# Patient Record
Sex: MALE | Race: WHITE | NOT HISPANIC OR LATINO | Employment: PART TIME | URBAN - METROPOLITAN AREA
[De-identification: names, ages, dates, MRNs, and addresses within clinical notes are randomized per-mention and may not be internally consistent; named-entity substitution may affect disease eponyms.]

---

## 2017-03-21 ENCOUNTER — OFFICE VISIT (OUTPATIENT)
Dept: PEDIATRICS | Facility: CLINIC | Age: 37
End: 2017-03-21
Payer: COMMERCIAL

## 2017-03-21 VITALS
SYSTOLIC BLOOD PRESSURE: 110 MMHG | DIASTOLIC BLOOD PRESSURE: 80 MMHG | WEIGHT: 191 LBS | HEIGHT: 71 IN | BODY MASS INDEX: 26.74 KG/M2 | TEMPERATURE: 97 F | OXYGEN SATURATION: 97 % | HEART RATE: 73 BPM

## 2017-03-21 DIAGNOSIS — L91.8 SKIN TAG: ICD-10-CM

## 2017-03-21 DIAGNOSIS — Z00.00 ROUTINE GENERAL MEDICAL EXAMINATION AT A HEALTH CARE FACILITY: Primary | ICD-10-CM

## 2017-03-21 DIAGNOSIS — E66.3 OVERWEIGHT (BMI 25.0-29.9): ICD-10-CM

## 2017-03-21 PROCEDURE — 99395 PREV VISIT EST AGE 18-39: CPT | Performed by: FAMILY MEDICINE

## 2017-03-21 ASSESSMENT — PAIN SCALES - GENERAL: PAINLEVEL: NO PAIN (0)

## 2017-03-21 NOTE — PROGRESS NOTES
SUBJECTIVE:     CC: Dakota France is an 37 year old male who presents for preventative health visit.     Healthy Habits:    Do you get at least three servings of calcium containing foods daily (dairy, green leafy vegetables, etc.)? yes    Amount of exercise or daily activities, outside of work: none    Problems taking medications regularly not applicable    Medication side effects: No    Have you had an eye exam in the past two years? yes    Do you see a dentist twice per year? No-scheduled next week    Do you have sleep apnea, excessive snoring or daytime drowsiness?no            Today's PHQ-2 Score:   PHQ-2 ( 1999 [employer]) 3/21/2017 6/16/2016   Q1: Little interest or pleasure in doing things 0 0   Q2: Feeling down, depressed or hopeless 0 0   PHQ-2 Score 0 0       Abuse: Current or Past(Physical, Sexual or Emotional)- No  Do you feel safe in your environment - Yes    Social History   Substance Use Topics     Smoking status: Former Smoker     Smokeless tobacco: Never Used     Alcohol use Yes     The patient does not drink >3 drinks per day nor >7 drinks per week.    Last PSA: No results found for: PSA    Recent Labs   Lab Test  05/11/15   0741   CHOL  209*   HDL  43   LDL  137*   TRIG  145   CHOLHDLRATIO  4.9       Reviewed orders with patient. Reviewed health maintenance and updated orders accordingly - Yes    Reviewed and updated as needed this visit by clinical staff  Tobacco  Allergies  Meds  Med Hx  Surg Hx  Fam Hx  Soc Hx        Reviewed and updated as needed this visit by Provider          Past Medical History   Diagnosis Date     PAC (premature atrial contraction)       Past Surgical History   Procedure Laterality Date     Herniorrhaphy inguinal bilateral              ROS:  C: NEGATIVE for fever, chills, change in weight  I: NEGATIVE for worrisome rashes, moles or lesions  E: NEGATIVE for vision changes or irritation  ENT: NEGATIVE for ear, mouth and throat problems  R: NEGATIVE for significant  "cough or SOB  CV: NEGATIVE for chest pain, palpitations or peripheral edema  GI: NEGATIVE for nausea, abdominal pain, heartburn, or change in bowel habits   male: negative for dysuria, hematuria, decreased urinary stream, erectile dysfunction, urethral discharge  M: NEGATIVE for significant arthralgias or myalgia  N: NEGATIVE for weakness, dizziness or paresthesias  E: NEGATIVE for temperature intolerance, skin/hair changes  H: NEGATIVE for bleeding problems  P: NEGATIVE for changes in mood or affect    Problem list, Medication list, Allergies, and Medical/Social/Surgical histories reviewed in [facility] and updated as appropriate.  Labs reviewed in EPIC  BP Readings from Last 3 Encounters:   03/21/17 110/80   06/23/16 118/82   06/16/16 (!) 138/92    Wt Readings from Last 3 Encounters:   03/21/17 191 lb (86.6 kg)   06/16/16 190 lb (86.2 kg)   11/02/15 200 lb (90.7 kg)                  Patient Active Problem List   Diagnosis     H/O cardiac arrhythmia-PAC     Overweight (BMI 25.0-29.9)     CARDIOVASCULAR SCREENING; LDL GOAL LESS THAN 160     Skin tag     Past Surgical History   Procedure Laterality Date     Herniorrhaphy inguinal bilateral         Social History   Substance Use Topics     Smoking status: Former Smoker     Smokeless tobacco: Never Used     Alcohol use Yes     Family History   Problem Relation Age of Onset     Prostate Cancer Father          No current outpatient prescriptions on file.     No Known Allergies  Recent Labs   Lab Test  05/11/15   0741  04/27/15   1452   LDL  137*   --    HDL  43   --    TRIG  145   --    CR   --   1.06   GFRESTIMATED   --   78   GFRESTBLACK   --   >90   GFR Calc     POTASSIUM   --   3.9   TSH   --   2.37      OBJECTIVE:     /80  Pulse 73  Temp 97  F (36.1  C) (Oral)  Ht 5' 10.5\" (1.791 m)  Wt 191 lb (86.6 kg)  SpO2 97%  BMI 27.02 kg/m2  EXAM:  GENERAL: healthy, alert and no distress  EYES: Eyes grossly normal to inspection, PERRL and conjunctivae and " "sclerae normal  HENT: ear canals and TM's normal, nose and mouth without ulcers or lesions  NECK: no adenopathy, no asymmetry, masses, or scars and thyroid normal to palpation  RESP: lungs clear to auscultation - no rales, rhonchi or wheezes  CV: regular rate and rhythm, normal S1 S2, no S3 or S4, no murmur, click or rub, no peripheral edema and peripheral pulses strong  ABDOMEN: soft, nontender, no hepatosplenomegaly, no masses and bowel sounds normal   (male): normal male genitalia without lesions or urethral discharge, no hernia  MS: no gross musculoskeletal defects noted, no edema  SKIN: 1-2mm skjin tags- neck  NEURO: Normal strength and tone, mentation intact and speech normal  PSYCH: mentation appears normal, affect normal/bright    ASSESSMENT/PLAN:     1. Routine general medical examination at a health care facility  : Discussed on regular exercises, healthy eating, self testicular exams  and routine dental checks.      2. Skin tag  Monitor, rtc prn    3. Overweight (BMI 25.0-29.9)  Emphasized on weight loss, portion control, low calorie and low fat diet, healthy eating, regular exercises.        COUNSELING:  Reviewed preventive health counseling, as reflected in patient instructions  Special attention given to:        Regular exercise       Healthy diet/nutrition         reports that he has quit smoking. He has never used smokeless tobacco.    Estimated body mass index is 27.02 kg/(m^2) as calculated from the following:    Height as of this encounter: 5' 10.5\" (1.791 m).    Weight as of this encounter: 191 lb (86.6 kg).   Weight management plan: Discussed healthy diet and exercise guidelines and patient will follow up in 12 months in clinic to re-evaluate.    Counseling Resources:  ATP IV Guidelines  Pooled Cohorts Equation Calculator  FRAX Risk Assessment  ICSI Preventive Guidelines  Dietary Guidelines for Americans, 2010  USDA's MyPlate  ASA Prophylaxis  Lung CA Screening    Kris Bernardo MD  M " [facility]  Chart documentation done in part with Dragon Voice recognition Software. Although reviewed after completion, some word and grammatical error may remain.

## 2017-03-21 NOTE — MR AVS SNAPSHOT
After Visit Summary   3/21/2017    Dakota France    MRN: [id number]           Patient Information     Date Of Birth          1980        Visit Information        Provider Department      3/21/2017 3:40 PM Kris Bernardo MD [facility]        Today's Diagnoses     Routine general medical examination at a health care facility    -  1    Skin tag          Care Instructions      Preventive Health Recommendations  Male Ages 26 - 39    Yearly exam:             See your health care provider every year in order to  o   Review health changes.   o   Discuss preventive care.    o   Review your medicines if your doctor has prescribed any.    You should be tested each year for STDs (sexually transmitted diseases), if you re at risk.     After age 35, talk to your provider about cholesterol testing. If you are at risk for heart disease, have your cholesterol tested at least every 5 years.     If you are at risk for diabetes, you should have a diabetes test (fasting glucose).  Shots: Get a flu shot each year. Get a tetanus shot every 10 years.     Nutrition:    Eat at least 5 servings of fruits and vegetables daily.     Eat whole-grain bread, whole-wheat pasta and brown rice instead of white grains and rice.     Talk to your provider about Calcium and Vitamin D.     Lifestyle    Exercise for at least 150 minutes a week (30 minutes a day, 5 days a week). This will help you control your weight and prevent disease.     Limit alcohol to one drink per day.     No smoking.     Wear sunscreen to prevent skin cancer.     See your dentist every six months for an exam and cleaning.           Follow-ups after your visit        Who to contact     If you have questions or need follow up information about today's clinic visit or your schedule please contact [facility] directly at [phone].  Normal or non-critical lab and imaging results will be communicated to you by Magalie  "letter or phone within 4 business days after the clinic has received the results. If you do not hear from us within 7 days, please contact the clinic through [employer] or phone. If you have a critical or abnormal lab result, we will notify you by phone as soon as possible.  Submit refill requests through [employer] or call your pharmacy and they will forward the refill request to us. Please allow 3 business days for your refill to be completed.          Additional Information About Your Visit        Zvooq Information     Zvooq is an electronic gateway that provides easy, online access to your medical records. With Zvooq, you can request a clinic appointment, read your test results, renew a prescription or communicate with your care team.     To sign up for Zvooq visit the website at [email or web address]   You will be asked to enter the access code listed below, as well as some personal information. Please follow the directions to create your username and password.     Your access code is: [email or web address]  Expires: 2017  3:57 PM     Your access code will  in 90 days. If you need help or a new code, please contact your [facility] or call [phone] for assistance.        Care EveryWhere ID     This is your Care EveryWhere ID. This could be used by other organizations to access your [facility] medical records  [id number]        Your Vitals Were     Pulse Temperature Height Pulse Oximetry BMI (Body Mass Index)       73 97  F (36.1  C) (Oral) 5' 10.5\" (1.791 m) 97% 27.02 kg/m2        Blood Pressure from Last 3 Encounters:   17 110/80   16 118/82   16 (!) 138/92    Weight from Last 3 Encounters:   17 191 lb (86.6 kg)   16 190 lb (86.2 kg)   11/02/15 200 lb (90.7 kg)              Today, you had the following     No orders found for display       Primary Care Provider Office Phone # Fax #    Kris Bernardo [phone]       " [facility] [address]  [facility] [address]        Thank you!     Thank you for choosing [facility]  for your care. Our goal is always to provide you with excellent care. Hearing back from our patients is one way we can continue to improve our services. Please take a few minutes to complete the written survey that you may receive in the mail after your visit with us. Thank you!             Your Updated Medication List - Protect others around you: Learn how to safely use, store and throw away your medicines at www.disposemymeds.org.      Notice  As of 3/21/2017  3:57 PM    You have not been prescribed any medications.

## 2017-03-21 NOTE — NURSING NOTE
"Chief Complaint   Patient presents with     Physical       Initial /80  Pulse 73  Temp 97  F (36.1  C) (Oral)  Ht 5' 10.5\" (1.791 m)  Wt 191 lb (86.6 kg)  SpO2 97%  BMI 27.02 kg/m2 Estimated body mass index is 27.02 kg/(m^2) as calculated from the following:    Height as of this encounter: 5' 10.5\" (1.791 m).    Weight as of this encounter: 191 lb (86.6 kg).  BP completed using cuff size: carlota Li, ISSA    "

## 2017-12-19 ENCOUNTER — OFFICE VISIT (OUTPATIENT)
Dept: FAMILY MEDICINE | Facility: CLINIC | Age: 37
End: 2017-12-19
Payer: COMMERCIAL

## 2017-12-19 VITALS
BODY MASS INDEX: 29.38 KG/M2 | OXYGEN SATURATION: 98 % | WEIGHT: 207.7 LBS | DIASTOLIC BLOOD PRESSURE: 87 MMHG | TEMPERATURE: 98.1 F | HEART RATE: 81 BPM | SYSTOLIC BLOOD PRESSURE: 133 MMHG

## 2017-12-19 DIAGNOSIS — H91.91 HEARING LOSS OF RIGHT EAR, UNSPECIFIED HEARING LOSS TYPE: ICD-10-CM

## 2017-12-19 DIAGNOSIS — J01.90 ACUTE SINUSITIS WITH SYMPTOMS > 10 DAYS: Primary | ICD-10-CM

## 2017-12-19 DIAGNOSIS — Z23 NEED FOR PROPHYLACTIC VACCINATION AND INOCULATION AGAINST INFLUENZA: ICD-10-CM

## 2017-12-19 PROCEDURE — 99213 OFFICE O/P EST LOW 20 MIN: CPT | Performed by: PHYSICIAN ASSISTANT

## 2017-12-19 NOTE — MR AVS SNAPSHOT
After Visit Summary   12/19/2017    Dakota France    MRN: [id number]           Patient Information     Date Of Birth          1980        Visit Information        Provider Department      12/19/2017 7:20 AM Radha Sandy PA-C [facility]        Today's Diagnoses     Hearing loss of right ear, unspecified hearing loss type    -  1    Need for prophylactic vaccination and inoculation against influenza        Acute sinusitis with symptoms > 10 days          Care Instructions    Augmentin 875 mg, 1 tablet twice a day for 10 days  Sudafed short acting  Increase fluids  Nasal wash  Mucinex DM  If not better after the above treatment, please see ENT            Follow-ups after your visit        Additional Services     OTOLARYNGOLOGY REFERRAL       Your provider has referred you to: [facility]: [facility] [phone]   [email or web address]    Please be aware that coverage of these services is subject to the terms and limitations of your health insurance plan.  Call member services at your health plan with any benefit or coverage questions.      Please bring the following with you to your appointment:    (1) Any X-Rays, CTs or MRIs which have been performed.  Contact the facility where they were done to arrange for  prior to your scheduled appointment.   (2) List of current medications  (3) This referral request   (4) Any documents/labs given to you for this referral                  Your next 10 appointments already scheduled     Apr 11, 2018  4:50 PM CDT   [facility]    [id number] [place] [address] [phone]              Who to contact     If you have questions or need follow up information about today's clinic visit or your schedule please contact [facility] directly at  "[phone].  Normal or non-critical lab and imaging results will be communicated to you by MyChart, letter or phone within 4 business days after the clinic has received the results. If you do not hear from us within 7 days, please contact the clinic through [employer] or phone. If you have a critical or abnormal lab result, we will notify you by phone as soon as possible.  Submit refill requests through [employer] or call your pharmacy and they will forward the refill request to us. Please allow 3 business days for your refill to be completed.          Additional Information About Your Visit        [employer] Information     [employer] lets you send messages to your doctor, view your test results, renew your prescriptions, schedule appointments and more. To sign up, go to [email or web address] . Click on \"Log in\" on the left side of the screen, which will take you to the Welcome page. Then click on \"Sign up Now\" on the right side of the page.     You will be asked to enter the access code listed below, as well as some personal information. Please follow the directions to create your username and password.     Your access code is: [email or web address]  Expires: 3/19/2018  7:57 AM     Your access code will  in 90 days. If you need help or a new code, please call your [facility] clinic or [phone].        Care EveryWhere ID     This is your Care EveryWhere ID. This could be used by other organizations to access your [facility] medical records  [id number]        Your Vitals Were     Pulse Temperature Pulse Oximetry BMI (Body Mass Index)          81 98.1  F (36.7  C) (Oral) 98% 29.38 kg/m2         Blood Pressure from Last 3 Encounters:   17 133/87   17 110/80   16 118/82    Weight from Last 3 Encounters:   17 207 lb 11.2 oz (94.2 kg)   17 191 lb (86.6 kg)   16 190 lb (86.2 kg)              We Performed the Following     OTOLARYNGOLOGY REFERRAL          Today's Medication Changes          These " changes are accurate as of: 12/19/17  7:57 AM.  If you have any questions, ask your nurse or doctor.               Start taking these medicines.        Dose/Directions    amoxicillin-clavulanate 875-125 MG per tablet   Commonly known as:  AUGMENTIN   Used for:  Acute sinusitis with symptoms > 10 days   Started by:  Radha Sandy PA-C        Dose:  1 tablet   Take 1 tablet by mouth 2 times daily for 10 days   Quantity:  20 tablet   Refills:  0            Where to get your medicines      These medications were sent to [facility] [place] - [address]  [address], [place] [address]     Phone:  [phone]     amoxicillin-clavulanate 875-125 MG per tablet                Primary Care Provider Office Phone # Fax #    Kris Bernardo [phone] [address]  [facility] [address]        Equal Access to Services     [facility]: Hadii aad ku hadasho Soomaali, waaxda luqadaha, qaybta kaalmada adeegyada, waxay idiin hayaan anujeg vargasaradagoberto soto . So [facility] [phone].    ATENCIÓN: Si habla español, tiene a mcdonnell disposición servicios gratuitos de asistencia lingüística. [facility] [phone].    We comply with applicable federal civil rights laws and [place] laws. We do not discriminate on the basis of race, color, national origin, age, disability, sex, sexual orientation, or gender identity.            Thank you!     Thank you for choosing [facility]  for your care. Our goal is always to provide you with excellent care. Hearing back from our patients is one way we can continue to improve our services. Please take a few minutes to complete the written survey that you may receive in the mail after your visit with us. Thank you!             Your Updated Medication List - Protect others around you: Learn how to safely use, store and throw away your medicines at www.disposemymeds.org.          This list is  accurate as of: 12/19/17  7:57 AM.  Always use your most recent med list.                   Brand Name Dispense Instructions for use Diagnosis    amoxicillin-clavulanate 875-125 MG per tablet    AUGMENTIN    20 tablet    Take 1 tablet by mouth 2 times daily for 10 days    Acute sinusitis with symptoms > 10 days

## 2017-12-19 NOTE — PROGRESS NOTES
SUBJECTIVE:   Dakota France is a 37 year old male who presents to clinic today for the following health issues:  ENT Symptoms             Symptoms: cc Present Absent Comment   Fever/Chills   x    Fatigue   x Felt tired when symptoms first started   Muscle Aches   x    Eye Irritation   x    Sneezing   x    Nasal Juan F/Drg   x    Sinus Pressure/Pain   x    Loss of smell   x    Dental pain   x    Sore Throat   x    Swollen Glands   x    Ear Pain/Fullness  x  Right ear, woke up and could not hear well   Cough   x    Wheeze   x    Chest Pain   x    Shortness of breath   x    Rash   x    Other   x      Symptom duration:  10/12/17   Symptom severity:  moderate got worse yesterday   Treatments tried:  antihistamines did not work, allegra D- did not help and made sleep    Contacts:  toddler is sick often   Went to [facility]- they told him he has fluid there and to take allegra D but patient could not sleep on it.           Problem list and histories reviewed & adjusted, as indicated.  Additional history: as documented    Patient Active Problem List   Diagnosis     H/O cardiac arrhythmia-PAC     Overweight (BMI 25.0-29.9)     CARDIOVASCULAR SCREENING; LDL GOAL LESS THAN 160     Skin tag     Past Surgical History:   Procedure Laterality Date     HERNIORRHAPHY INGUINAL BILATERAL         Social History   Substance Use Topics     Smoking status: Former Smoker     Smokeless tobacco: Never Used     Alcohol use Yes     Family History   Problem Relation Age of Onset     Prostate Cancer Father          Current Outpatient Prescriptions   Medication Sig Dispense Refill     amoxicillin-clavulanate (AUGMENTIN) 875-125 MG per tablet Take 1 tablet by mouth 2 times daily for 10 days 20 tablet 0     No Known Allergies        ROS:  Constitutional, HEENT, cardiovascular, pulmonary, gi and gu systems are negative, except as otherwise noted.      OBJECTIVE:   /87 (BP Location: Right arm, Patient Position: Chair, Cuff Size:  Adult Regular)  Pulse 81  Temp 98.1  F (36.7  C) (Oral)  Wt 207 lb 11.2 oz (94.2 kg)  SpO2 98%  BMI 29.38 kg/m2  Body mass index is 29.38 kg/(m^2).  GENERAL: healthy, alert and no distress  EYES: Eyes grossly normal to inspection, PERRL and conjunctivae and sclerae normal  HENT: normal cephalic/atraumatic, ear canals and TM's normal, nose and mouth without ulcers or lesions, nasal mucosa edematous  and oral mucous membranes moist  NECK: no adenopathy, no asymmetry, masses, or scars and thyroid normal to palpation  RESP: lungs clear to auscultation - no rales, rhonchi or wheezes  CV: regular rate and rhythm, normal S1 S2, no S3 or S4, no murmur, click or rub, no peripheral edema and peripheral pulses strong  ABDOMEN: soft, nontender, no hepatosplenomegaly, no masses and bowel sounds normal  MS: no gross musculoskeletal defects noted, no edema    Diagnostic Test Results:  none     ASSESSMENT/PLAN:       ICD-10-CM    1. Acute sinusitis with symptoms > 10 days J01.90 amoxicillin-clavulanate (AUGMENTIN) 875-125 MG per tablet   2. Hearing loss of right ear, unspecified hearing loss type H91.91 OTOLARYNGOLOGY REFERRAL   3. Need for prophylactic vaccination and inoculation against influenza Z23    Augmentin 875 mg, 1 tablet twice a day for 10 days  Sudafed short acting  Increase fluids  Nasal wash  Mucinex DM  If not better after the above treatment, please see ENT        Radha Sandy PA-C  [facility]

## 2017-12-19 NOTE — PATIENT INSTRUCTIONS
Augmentin 875 mg, 1 tablet twice a day for 10 days  Sudafed short acting  Increase fluids  Nasal wash  Mucinex DM  If not better after the above treatment, please see ENT

## 2018-04-11 ENCOUNTER — OFFICE VISIT (OUTPATIENT)
Dept: PEDIATRICS | Facility: CLINIC | Age: 38
End: 2018-04-11
Payer: COMMERCIAL

## 2018-04-11 VITALS
TEMPERATURE: 97.6 F | SYSTOLIC BLOOD PRESSURE: 128 MMHG | BODY MASS INDEX: 27.75 KG/M2 | DIASTOLIC BLOOD PRESSURE: 72 MMHG | OXYGEN SATURATION: 97 % | WEIGHT: 198.2 LBS | HEIGHT: 71 IN | HEART RATE: 76 BPM

## 2018-04-11 DIAGNOSIS — E66.3 OVERWEIGHT (BMI 25.0-29.9): ICD-10-CM

## 2018-04-11 DIAGNOSIS — Z80.42 FAMILY HISTORY OF PROSTATE CANCER IN FATHER: ICD-10-CM

## 2018-04-11 DIAGNOSIS — Z00.00 ROUTINE GENERAL MEDICAL EXAMINATION AT A HEALTH CARE FACILITY: Primary | ICD-10-CM

## 2018-04-11 DIAGNOSIS — Z13.6 CARDIOVASCULAR SCREENING; LDL GOAL LESS THAN 160: ICD-10-CM

## 2018-04-11 DIAGNOSIS — Z13.0 SCREENING FOR DEFICIENCY ANEMIA: ICD-10-CM

## 2018-04-11 DIAGNOSIS — Z82.49 FAMILY HISTORY OF ABDOMINAL AORTIC ANEURYSM (AAA): ICD-10-CM

## 2018-04-11 DIAGNOSIS — L81.9 PIGMENTED SKIN LESION OF UNCERTAIN NATURE: ICD-10-CM

## 2018-04-11 DIAGNOSIS — Z13.1 SCREENING FOR DIABETES MELLITUS (DM): ICD-10-CM

## 2018-04-11 PROCEDURE — 99396 PREV VISIT EST AGE 40-64: CPT | Performed by: FAMILY MEDICINE

## 2018-04-11 ASSESSMENT — PAIN SCALES - GENERAL: PAINLEVEL: NO PAIN (0)

## 2018-04-11 NOTE — PROGRESS NOTES
SUBJECTIVE:   CC: Dakota France is an 38 year old male who presents for preventative health visit.     Healthy Habits:    Do you get at least three servings of calcium containing foods daily (dairy, green leafy vegetables, etc.)? yes    Amount of exercise or daily activities, outside of work: no current routine    Problems taking medications regularly not applicable    Medication side effects: No    Have you had an eye exam in the past two years? yes    Do you see a dentist twice per year? No, once per year    Do you have sleep apnea, excessive snoring or daytime drowsiness?no           Today's PHQ-2 Score:   PHQ-2 ( 1999 [employer]) 4/11/2018 3/21/2017   Q1: Little interest or pleasure in doing things 0 0   Q2: Feeling down, depressed or hopeless 0 0   PHQ-2 Score 0 0       Abuse: Current or Past(Physical, Sexual or Emotional)- No  Do you feel safe in your environment - Yes    Social History   Substance Use Topics     Smoking status: Former Smoker     Smokeless tobacco: Never Used     Alcohol use Yes      If you drink alcohol do you typically have >3 drinks per day or >7 drinks per week? No                      Last PSA: No results found for: PSA    Reviewed orders with patient. Reviewed health maintenance and updated orders accordingly - Yes  Labs reviewed in EPIC  BP Readings from Last 3 Encounters:   04/11/18 128/72   12/19/17 133/87   03/21/17 110/80    Wt Readings from Last 3 Encounters:   04/11/18 198 lb 3.2 oz (89.9 kg)   12/19/17 207 lb 11.2 oz (94.2 kg)   03/21/17 191 lb (86.6 kg)                  Patient Active Problem List   Diagnosis     H/O cardiac arrhythmia-PAC     Overweight (BMI 25.0-29.9)     CARDIOVASCULAR SCREENING; LDL GOAL LESS THAN 160     Skin tag     Family history of abdominal aortic aneurysm (AAA)     Family history of prostate cancer in father     Past Surgical History:   Procedure Laterality Date     HERNIORRHAPHY INGUINAL BILATERAL         Social History   Substance Use Topics      "Smoking status: Former Smoker     Smokeless tobacco: Never Used     Alcohol use Yes     Family History   Problem Relation Age of Onset     Prostate Cancer Father      Abdominal Aortic Aneurysm Father 70     Prostate Problems Father          No current outpatient prescriptions on file.     No Known Allergies  Recent Labs   Lab Test  05/11/15   0741  04/27/15   1452   LDL  137*   --    HDL  43   --    TRIG  145   --    CR   --   1.06   GFRESTIMATED   --   78   GFRESTBLACK   --   >90   GFR Calc     POTASSIUM   --   3.9   TSH   --   2.37        Reviewed and updated as needed this visit by clinical staff  Tobacco  Allergies  Meds  Med Hx  Surg Hx  Fam Hx  Soc Hx        Reviewed and updated as needed this visit by Provider          Past Medical History:   Diagnosis Date     PAC (premature atrial contraction)       Past Surgical History:   Procedure Laterality Date     HERNIORRHAPHY INGUINAL BILATERAL              ROS:  C: NEGATIVE for fever, chills, change in weight  INTEGUMENTARY/SKIN: Reoccurring, raised pigmented skin lesion in the pubic area status post excision in the past ×3 with no associated concerns for bleeding, itching, recent increase in size or color change  E: NEGATIVE for vision changes or irritation  ENT: NEGATIVE for ear, mouth and throat problems  R: NEGATIVE for significant cough or SOB  CV: NEGATIVE for chest pain, palpitations or peripheral edema  GI: NEGATIVE for nausea, abdominal pain, heartburn, or change in bowel habits   male: negative for dysuria, hematuria, decreased urinary stream, erectile dysfunction, urethral discharge  M: NEGATIVE for significant arthralgias or myalgia  N: NEGATIVE for weakness, dizziness or paresthesias  E: NEGATIVE for temperature intolerance, skin/hair changes  H: NEGATIVE for bleeding problems  P: NEGATIVE for changes in mood or affect    OBJECTIVE:   /72  Pulse 76  Temp 97.6  F (36.4  C) (Oral)  Ht 5' 10.75\" (1.797 m)  Wt 198 lb 3.2 " oz (89.9 kg)  SpO2 97%  BMI 27.84 kg/m2  EXAM:  GENERAL: healthy, alert and no distress  EYES: Eyes grossly normal to inspection  HENT: ear canals and TM's normal, nose and mouth without ulcers or lesions  NECK: no adenopathy, no asymmetry, masses, or scars and thyroid normal to palpation  RESP: lungs clear to auscultation - no rales, rhonchi or wheezes  CV: regular rate and rhythm, normal S1 S2, no S3 or S4, no murmur, click or rub, no peripheral edema and peripheral pulses strong  ABDOMEN: soft, nontender, no hepatosplenomegaly, no masses and bowel sounds normal  MS: no gross musculoskeletal defects noted, no edema  SKIN: About 3-4 mm, black, raised pigmented skin lesion in the suprapubic area  NEURO: Normal strength and tone, mentation intact and speech normal  PSYCH: mentation appears normal, affect normal/bright    ASSESSMENT/PLAN:   1. Routine general medical examination at a health care facility  : Discussed on regular exercises, healthy eating, self testicular exams  and routine dental checks.  - CBC with platelets differential; Future  - **Comprehensive metabolic panel FUTURE anytime; Future  - Prostate spec antigen screen; Future  - Lipid panel reflex to direct LDL Fasting; Future    2. Family history of abdominal aortic aneurysm (AAA)  Will screen for AAA due to family history, patient is aware to check with insurance before scheduling  - US abdominal aorta limited; Future    3. Family history of prostate cancer in father  Will f/u on results and call with recommendations.    - Prostate spec antigen screen; Future    4. CARDIOVASCULAR SCREENING; LDL GOAL LESS THAN 160    - Lipid panel reflex to direct LDL Fasting; Future    5. Pigmented skin lesion of uncertain nature  3-4mm, suprapubic  Recommended excisional biopsy due to recurrences status post excision ×3-(last excision was 4-5 years ago per patient's report)    6. Screening for diabetes mellitus (DM)    - **Comprehensive metabolic panel FUTURE  "anytime; Future    7. Screening for deficiency anemia    - CBC with platelets differential; Future    8. Overweight (BMI 25.0-29.9)  Wt Readings from Last 5 Encounters:   04/11/18 198 lb 3.2 oz (89.9 kg)   12/19/17 207 lb 11.2 oz (94.2 kg)   03/21/17 191 lb (86.6 kg)   06/16/16 190 lb (86.2 kg)   11/02/15 200 lb (90.7 kg)       Appreciated patient's efforts on weight loss, continue with portion control, healthy eating and regular exercises.      COUNSELING:  Reviewed preventive health counseling, as reflected in patient instructions  Special attention given to:        Regular exercise       Healthy diet/nutrition       Vision screening    BP Screening:   Last 3 BP Readings:    BP Readings from Last 3 Encounters:   04/11/18 128/72   12/19/17 133/87   03/21/17 110/80       The following was recommended to the patient:  Re-screen BP within a year and recommended lifestyle modifications   reports that he has quit smoking. He has never used smokeless tobacco.    Estimated body mass index is 27.84 kg/(m^2) as calculated from the following:    Height as of this encounter: 5' 10.75\" (1.797 m).    Weight as of this encounter: 198 lb 3.2 oz (89.9 kg).   Weight management plan: Discussed healthy diet and exercise guidelines and patient will follow up in 12 months in clinic to re-evaluate.    Counseling Resources:  ATP IV Guidelines  Pooled Cohorts Equation Calculator  FRAX Risk Assessment  ICSI Preventive Guidelines  Dietary Guidelines for Americans, 2010  USDA's MyPlate  ASA Prophylaxis  Lung CA Screening    Kris Bernardo MD  [facility]  Chart documentation done in part with Dragon Voice recognition Software. Although reviewed after completion, some word and grammatical error may remain.    "

## 2018-04-11 NOTE — NURSING NOTE
"Chief Complaint   Patient presents with     Physical     Family History Of     Father with AAA, prostate cancer - discuss screenings he should be doing       Initial /90 (BP Location: Right arm, Patient Position: Sitting, Cuff Size: Adult Large)  Pulse 76  Temp 97.6  F (36.4  C) (Oral)  Ht 5' 10.75\" (1.797 m)  Wt 198 lb 3.2 oz (89.9 kg)  SpO2 97%  BMI 27.84 kg/m2 Estimated body mass index is 27.84 kg/(m^2) as calculated from the following:    Height as of this encounter: 5' 10.75\" (1.797 m).    Weight as of this encounter: 198 lb 3.2 oz (89.9 kg).  Medication Reconciliation: complete  Santosh Li, ISSA    "

## 2018-04-11 NOTE — MR AVS SNAPSHOT
After Visit Summary   4/11/2018    Dakota France    MRN: [id number]           Patient Information     Date Of Birth          1980        Visit Information        Provider Department      4/11/2018 4:50 PM Kris Bernardo MD [facility]        Today's Diagnoses     Routine general medical examination at a health care facility    -  1    Family history of abdominal aortic aneurysm (AAA)        Family history of prostate cancer in father        CARDIOVASCULAR SCREENING; LDL GOAL LESS THAN 160        Pigmented skin lesion of uncertain nature        Screening for diabetes mellitus (DM)        Screening for deficiency anemia        Overweight (BMI 25.0-29.9)          Care Instructions    Schedule for fasting labs  - see appointment details below  Schedule for skin lesion biopsy  - see appointment details below  Call [phone] to schedule for US AAA screening (please check with your insurance)           Preventive Health Recommendations  Male Ages 40 to 49    Yearly exam:             See your health care provider every year in order to  o   Review health changes.   o   Discuss preventive care.    o   Review your medicines if your doctor has prescribed any.    You should be tested each year for STDs (sexually transmitted diseases) if you re at risk.     Have a cholesterol test every 5 years.     Have a colonoscopy (test for colon cancer) if someone in your family has had colon cancer or polyps before age 50.     After age 45, have a diabetes test (fasting glucose). If you are at risk for diabetes, you should have this test every 3 years.      Talk with your health care provider about whether or not a prostate cancer screening test (PSA) is right for you.    Shots: Get a flu shot each year. Get a tetanus shot every 10 years.     Nutrition:    Eat at least 5 servings of fruits and vegetables daily.     Eat whole-grain bread, whole-wheat pasta and brown rice instead of white grains  and rice.     Talk to your provider about Calcium and Vitamin D.     Lifestyle    Exercise for at least 150 minutes a week (30 minutes a day, 5 days a week). This will help you control your weight and prevent disease.     Limit alcohol to one drink per day.     No smoking.     Wear sunscreen to prevent skin cancer.     See your dentist every six months for an exam and cleaning.              Follow-ups after your visit        Your next 10 appointments already scheduled     Apr 17, 2018  7:10 AM CDT   LAB with LAB FIRST FLOOR [facility] ([facility])    [address] [place] [address] [id number] [phone]           Please do not eat 10-12 hours before your appointment if you are coming in fasting for labs on lipids, cholesterol, or glucose (sugar). This does not apply to pregnant women. Water, hot tea and black coffee (with nothing added) are okay. Do not drink other fluids, diet soda or chew gum.            Apr 27, 2018 11:20 AM CDT   Return Visit with Kris Bernardo MD, PROC RM 1 [facility] ([facility])    [address] [place] [address] [phone] [phone]              Future tests that were ordered for you today     Open Future Orders        Priority Expected Expires Ordered    CBC with platelets differential Routine 4/11/2018 5/11/2018 4/11/2018    **Comprehensive metabolic panel FUTURE anytime Routine 4/11/2018 5/11/2018 4/11/2018    Prostate spec antigen screen Routine 4/11/2018 5/11/2018 4/11/2018    Lipid panel reflex to direct LDL Fasting Routine 4/11/2018 5/11/2018 4/11/2018    US abdominal aorta limited Routine  4/11/2019 4/11/2018            Who to contact     If you have questions or need follow up information about today's clinic visit or your schedule please contact [facility] directly at [phone].  Normal or non-critical lab and imaging results will be communicated to you by  "MyChart, letter or phone within 4 business days after the clinic has received the results. If you do not hear from us within 7 days, please contact the clinic through [employer] or phone. If you have a critical or abnormal lab result, we will notify you by phone as soon as possible.  Submit refill requests through [employer] or call your pharmacy and they will forward the refill request to us. Please allow 3 business days for your refill to be completed.          Additional Information About Your Visit        [employer] Information     [employer] is an electronic gateway that provides easy, online access to your medical records. With [employer], you can request a clinic appointment, read your test results, renew a prescription or communicate with your care team.     To sign up for [employer] visit the website at [email or web address]   You will be asked to enter the access code listed below, as well as some personal information. Please follow the directions to create your username and password.     Your access code is: [email or web address]  Expires: 7/10/2018  5:28 PM     Your access code will  in 90 days. If you need help or a new code, please contact your [facility] or call [phone] for assistance.        Care EveryWhere ID     This is your Care EveryWhere ID. This could be used by other organizations to access your [facility] medical records  [id number]        Your Vitals Were     Pulse Temperature Height Pulse Oximetry BMI (Body Mass Index)       76 97.6  F (36.4  C) (Oral) 5' 10.75\" (1.797 m) 97% 27.84 kg/m2        Blood Pressure from Last 3 Encounters:   18 128/72   17 133/87   17 110/80    Weight from Last 3 Encounters:   18 198 lb 3.2 oz (89.9 kg)   17 207 lb 11.2 oz (94.2 kg)   17 191 lb (86.6 kg)               Primary Care Provider Office Phone # Fax #    Kris Bernardo [phone] [address]  [facility] [address]   "      Equal Access to Services     [facility] : Hadii josefa Rice, wamiguel adominique [place], joejulianne marcial. So [facility] [phone].    ATENCIÓN: Si habla español, tiene a mcdonnell disposición servicios gratuitos de asistencia lingüística. Llame al [phone].    We comply with applicable federal civil rights laws and [place] laws. We do not discriminate on the basis of race, color, national origin, age, disability, sex, sexual orientation, or gender identity.            Thank you!     Thank you for choosing [facility]  for your care. Our goal is always to provide you with excellent care. Hearing back from our patients is one way we can continue to improve our services. Please take a few minutes to complete the written survey that you may receive in the mail after your visit with us. Thank you!             Your Updated Medication List - Protect others around you: Learn how to safely use, store and throw away your medicines at www.disposemymeds.org.      Notice  As of 4/11/2018  5:34 PM    You have not been prescribed any medications.

## 2018-04-11 NOTE — PATIENT INSTRUCTIONS
Schedule for fasting labs  - see appointment details below  Schedule for skin lesion biopsy  - see appointment details below  Call [phone] to schedule for US AAA screening (please check with your insurance)       Preventive Health Recommendations  Male Ages 40 to 49    Yearly exam:             See your health care provider every year in order to  o   Review health changes.   o   Discuss preventive care.    o   Review your medicines if your doctor has prescribed any.    You should be tested each year for STDs (sexually transmitted diseases) if you re at risk.     Have a cholesterol test every 5 years.     Have a colonoscopy (test for colon cancer) if someone in your family has had colon cancer or polyps before age 50.     After age 45, have a diabetes test (fasting glucose). If you are at risk for diabetes, you should have this test every 3 years.      Talk with your health care provider about whether or not a prostate cancer screening test (PSA) is right for you.    Shots: Get a flu shot each year. Get a tetanus shot every 10 years.     Nutrition:    Eat at least 5 servings of fruits and vegetables daily.     Eat whole-grain bread, whole-wheat pasta and brown rice instead of white grains and rice.     Talk to your provider about Calcium and Vitamin D.     Lifestyle    Exercise for at least 150 minutes a week (30 minutes a day, 5 days a week). This will help you control your weight and prevent disease.     Limit alcohol to one drink per day.     No smoking.     Wear sunscreen to prevent skin cancer.     See your dentist every six months for an exam and cleaning.

## 2018-04-11 NOTE — LETTER
May 16, 2018      Dakota France  [address] [place] [id number]              Dear Dakota,      In order to ensure that we are providing the best quality care, we would like to remind you that you are due for fasting lab work. Please let us know if you have any questions and we would be happy to help.     Thank you for trusting us with your care.    Sincerely,       [facility] Team  [phone]

## 2019-07-18 ENCOUNTER — DOCUMENTATION ONLY (OUTPATIENT)
Dept: LAB | Facility: CLINIC | Age: 39
End: 2019-07-18

## 2019-07-18 DIAGNOSIS — Z13.6 CARDIOVASCULAR SCREENING; LDL GOAL LESS THAN 160: ICD-10-CM

## 2019-07-18 DIAGNOSIS — Z80.42 FAMILY HISTORY OF PROSTATE CANCER IN FATHER: ICD-10-CM

## 2019-07-18 DIAGNOSIS — Z13.0 SCREENING FOR DEFICIENCY ANEMIA: ICD-10-CM

## 2019-07-18 DIAGNOSIS — Z13.1 SCREENING FOR DIABETES MELLITUS (DM): ICD-10-CM

## 2019-07-18 DIAGNOSIS — Z00.00 ROUTINE GENERAL MEDICAL EXAMINATION AT A HEALTH CARE FACILITY: Primary | ICD-10-CM

## 2019-07-18 DIAGNOSIS — Z12.5 PROSTATE CANCER SCREENING: ICD-10-CM

## 2019-07-22 ENCOUNTER — OFFICE VISIT (OUTPATIENT)
Dept: PEDIATRICS | Facility: CLINIC | Age: 39
End: 2019-07-22
Payer: COMMERCIAL

## 2019-07-22 VITALS
SYSTOLIC BLOOD PRESSURE: 119 MMHG | HEIGHT: 71 IN | DIASTOLIC BLOOD PRESSURE: 85 MMHG | WEIGHT: 198.4 LBS | HEART RATE: 69 BPM | TEMPERATURE: 97.8 F | OXYGEN SATURATION: 98 % | BODY MASS INDEX: 27.77 KG/M2

## 2019-07-22 DIAGNOSIS — Z82.49 FAMILY HISTORY OF ABDOMINAL AORTIC ANEURYSM (AAA): ICD-10-CM

## 2019-07-22 DIAGNOSIS — Z11.4 SCREENING FOR HUMAN IMMUNODEFICIENCY VIRUS: ICD-10-CM

## 2019-07-22 DIAGNOSIS — L81.9 PIGMENTED SKIN LESION: ICD-10-CM

## 2019-07-22 DIAGNOSIS — Z80.42 FAMILY HISTORY OF PROSTATE CANCER IN FATHER: ICD-10-CM

## 2019-07-22 DIAGNOSIS — Z13.6 CARDIOVASCULAR SCREENING; LDL GOAL LESS THAN 160: ICD-10-CM

## 2019-07-22 DIAGNOSIS — Z13.29 SCREENING FOR THYROID DISORDER: ICD-10-CM

## 2019-07-22 DIAGNOSIS — Z12.5 PROSTATE CANCER SCREENING: ICD-10-CM

## 2019-07-22 DIAGNOSIS — Z00.00 ROUTINE GENERAL MEDICAL EXAMINATION AT A HEALTH CARE FACILITY: Primary | ICD-10-CM

## 2019-07-22 DIAGNOSIS — Z00.00 ROUTINE GENERAL MEDICAL EXAMINATION AT A HEALTH CARE FACILITY: ICD-10-CM

## 2019-07-22 DIAGNOSIS — E66.3 OVERWEIGHT (BMI 25.0-29.9): ICD-10-CM

## 2019-07-22 DIAGNOSIS — Z13.0 SCREENING FOR DEFICIENCY ANEMIA: ICD-10-CM

## 2019-07-22 DIAGNOSIS — Z13.1 SCREENING FOR DIABETES MELLITUS (DM): ICD-10-CM

## 2019-07-22 DIAGNOSIS — Z13.6 SCREENING FOR AAA (ABDOMINAL AORTIC ANEURYSM): ICD-10-CM

## 2019-07-22 DIAGNOSIS — R17 ELEVATED BILIRUBIN: ICD-10-CM

## 2019-07-22 LAB
ALBUMIN SERPL-MCNC: 4.4 G/DL (ref 3.4–5)
ALP SERPL-CCNC: 68 U/L (ref 40–150)
ALT SERPL W P-5'-P-CCNC: 21 U/L (ref 0–70)
ANION GAP SERPL CALCULATED.3IONS-SCNC: 4 MMOL/L (ref 3–14)
AST SERPL W P-5'-P-CCNC: 17 U/L (ref 0–45)
BASOPHILS # BLD AUTO: 0 10E9/L (ref 0–0.2)
BASOPHILS NFR BLD AUTO: 0.4 %
BILIRUB SERPL-MCNC: 1.5 MG/DL (ref 0.2–1.3)
BUN SERPL-MCNC: 18 MG/DL (ref 7–30)
CALCIUM SERPL-MCNC: 9.4 MG/DL (ref 8.5–10.1)
CHLORIDE SERPL-SCNC: 106 MMOL/L (ref 94–109)
CHOLEST SERPL-MCNC: 221 MG/DL
CO2 SERPL-SCNC: 30 MMOL/L (ref 20–32)
CREAT SERPL-MCNC: 0.97 MG/DL (ref 0.66–1.25)
DIFFERENTIAL METHOD BLD: NORMAL
EOSINOPHIL # BLD AUTO: 0.1 10E9/L (ref 0–0.7)
EOSINOPHIL NFR BLD AUTO: 1.9 %
ERYTHROCYTE [DISTWIDTH] IN BLOOD BY AUTOMATED COUNT: 11.5 % (ref 10–15)
GFR SERPL CREATININE-BSD FRML MDRD: >90 ML/MIN/{1.73_M2}
GLUCOSE SERPL-MCNC: 98 MG/DL (ref 70–99)
HBA1C MFR BLD: 5.3 % (ref 0–5.6)
HCT VFR BLD AUTO: 45.5 % (ref 40–53)
HDLC SERPL-MCNC: 42 MG/DL
HGB BLD-MCNC: 15.5 G/DL (ref 13.3–17.7)
HIV 1+2 AB+HIV1 P24 AG SERPL QL IA: NONREACTIVE
IMM GRANULOCYTES # BLD: 0 10E9/L (ref 0–0.4)
IMM GRANULOCYTES NFR BLD: 0 %
LDLC SERPL CALC-MCNC: 145 MG/DL
LYMPHOCYTES # BLD AUTO: 2.5 10E9/L (ref 0.8–5.3)
LYMPHOCYTES NFR BLD AUTO: 47.8 %
MCH RBC QN AUTO: 29.8 PG (ref 26.5–33)
MCHC RBC AUTO-ENTMCNC: 34.1 G/DL (ref 31.5–36.5)
MCV RBC AUTO: 87 FL (ref 78–100)
MONOCYTES # BLD AUTO: 0.5 10E9/L (ref 0–1.3)
MONOCYTES NFR BLD AUTO: 8.5 %
NEUTROPHILS # BLD AUTO: 2.2 10E9/L (ref 1.6–8.3)
NEUTROPHILS NFR BLD AUTO: 41.4 %
NONHDLC SERPL-MCNC: 179 MG/DL
PLATELET # BLD AUTO: 199 10E9/L (ref 150–450)
POTASSIUM SERPL-SCNC: 4.1 MMOL/L (ref 3.4–5.3)
PROT SERPL-MCNC: 8 G/DL (ref 6.8–8.8)
PSA SERPL-ACNC: 1.35 UG/L (ref 0–4)
RBC # BLD AUTO: 5.21 10E12/L (ref 4.4–5.9)
SODIUM SERPL-SCNC: 140 MMOL/L (ref 133–144)
TRIGL SERPL-MCNC: 171 MG/DL
TSH SERPL DL<=0.005 MIU/L-ACNC: 1.41 MU/L (ref 0.4–4)
WBC # BLD AUTO: 5.3 10E9/L (ref 4–11)

## 2019-07-22 PROCEDURE — 80053 COMPREHEN METABOLIC PANEL: CPT | Performed by: FAMILY MEDICINE

## 2019-07-22 PROCEDURE — G0103 PSA SCREENING: HCPCS | Performed by: FAMILY MEDICINE

## 2019-07-22 PROCEDURE — 85025 COMPLETE CBC W/AUTO DIFF WBC: CPT | Performed by: FAMILY MEDICINE

## 2019-07-22 PROCEDURE — 99396 PREV VISIT EST AGE 40-64: CPT | Performed by: FAMILY MEDICINE

## 2019-07-22 PROCEDURE — 83036 HEMOGLOBIN GLYCOSYLATED A1C: CPT | Performed by: FAMILY MEDICINE

## 2019-07-22 PROCEDURE — 87389 HIV-1 AG W/HIV-1&-2 AB AG IA: CPT | Performed by: FAMILY MEDICINE

## 2019-07-22 PROCEDURE — 36415 COLL VENOUS BLD VENIPUNCTURE: CPT | Performed by: FAMILY MEDICINE

## 2019-07-22 PROCEDURE — 84443 ASSAY THYROID STIM HORMONE: CPT | Performed by: FAMILY MEDICINE

## 2019-07-22 PROCEDURE — 80061 LIPID PANEL: CPT | Performed by: FAMILY MEDICINE

## 2019-07-22 ASSESSMENT — MIFFLIN-ST. JEOR: SCORE: 1831.03

## 2019-07-22 NOTE — PATIENT INSTRUCTIONS
Schedule for non fasting labs in 2-3 weeks for liver tests  Schedule for skin consult  Schedule for US after checking with insurance    Preventive Health Recommendations  Male Ages 40 to 49    Yearly exam:             See your health care provider every year in order to  o   Review health changes.   o   Discuss preventive care.    o   Review your medicines if your doctor has prescribed any.    You should be tested each year for STDs (sexually transmitted diseases) if you re at risk.     Have a cholesterol test every 5 years.     Have a colonoscopy (test for colon cancer) if someone in your family has had colon cancer or polyps before age 50.     After age 45, have a diabetes test (fasting glucose). If you are at risk for diabetes, you should have this test every 3 years.      Talk with your health care provider about whether or not a prostate cancer screening test (PSA) is right for you.    Shots: Get a flu shot each year. Get a tetanus shot every 10 years.     Nutrition:    Eat at least 5 servings of fruits and vegetables daily.     Eat whole-grain bread, whole-wheat pasta and brown rice instead of white grains and rice.     Get adequate Calcium and Vitamin D.     Lifestyle    Exercise for at least 150 minutes a week (30 minutes a day, 5 days a week). This will help you control your weight and prevent disease.     Limit alcohol to one drink per day.     No smoking.     Wear sunscreen to prevent skin cancer.     See your dentist every six months for an exam and cleaning.

## 2019-07-22 NOTE — PROGRESS NOTES
SUBJECTIVE:   CC: Dakota France is an 39 year old male who presents for preventive health visit.     Healthy Habits:    Do you get at least three servings of calcium containing foods daily (dairy, green leafy vegetables, etc.)? yes    Amount of exercise or daily activities, outside of work: 3 day(s) per week    Problems taking medications regularly not applicable    Medication side effects: No    Have you had an eye exam in the past two years? Yes    Do you see a dentist twice per year? Once per year    Do you have sleep apnea, excessive snoring or daytime drowsiness?no     QUESTIONS/ CONCERNS: Patient reports father with history of aortic aneurysm, discuss preventive screening for that. Mole in belly button. Check thyroid, lipid and a1c.           Today's PHQ-2 Score:   PHQ-2 ( 1999 Pf[employer]) 7/22/2019 4/11/2018   Q1: Little interest or pleasure in doing things 0 0   Q2: Feeling down, depressed or hopeless 0 0   PHQ-2 Score 0 0     Abuse: Current or Past(Physical, Sexual or Emotional)- No  Do you feel safe in your environment? Yes    Social History     Tobacco Use     Smoking status: Former Smoker     Smokeless tobacco: Never Used   Substance Use Topics     Alcohol use: Yes     If you drink alcohol do you typically have >3 drinks per day or >7 drinks per week? No                      Last PSA:   PSA   Date Value Ref Range Status   07/22/2019 1.35 0 - 4 ug/L Final     Comment:     Assay Method:  Chemiluminescence using Siemens Vista analyzer       Reviewed orders with patient. Reviewed health maintenance and updated orders accordingly - Yes  Lab work is in process  Labs reviewed in EPIC  BP Readings from Last 3 Encounters:   07/22/19 119/85   04/11/18 128/72   12/19/17 133/87    Wt Readings from Last 3 Encounters:   07/22/19 90 kg (198 lb 6.4 oz)   04/11/18 89.9 kg (198 lb 3.2 oz)   12/19/17 94.2 kg (207 lb 11.2 oz)                  Patient Active Problem List   Diagnosis     H/O cardiac arrhythmia-PAC      Overweight (BMI 25.0-29.9)     CARDIOVASCULAR SCREENING; LDL GOAL LESS THAN 160     Skin tag     Family history of abdominal aortic aneurysm (AAA)     Family history of prostate cancer in father     Pigmented skin lesion     Past Surgical History:   Procedure Laterality Date     HERNIORRHAPHY INGUINAL BILATERAL         Social History     Tobacco Use     Smoking status: Former Smoker     Smokeless tobacco: Never Used   Substance Use Topics     Alcohol use: Yes     Family History   Problem Relation Age of Onset     Prostate Cancer Father      Abdominal Aortic Aneurysm Father 70     Prostate Problems Father          No current outpatient medications on file.     No Known Allergies  Recent Labs   Lab Test 07/22/19  0945 05/11/15  0741 04/27/15  1452   * 137*  --    HDL 42 43  --    TRIG 171* 145  --    ALT 21  --   --    CR 0.97  --  1.06   GFRESTIMATED >90  --  78   GFRESTBLACK >90  --  >90  African American GFR Calc     POTASSIUM 4.1  --  3.9   TSH  --   --  2.37        Reviewed and updated as needed this visit by clinical staff  Tobacco  Allergies  Meds         Reviewed and updated as needed this visit by Provider          Past Medical History:   Diagnosis Date     PAC (premature atrial contraction)       Past Surgical History:   Procedure Laterality Date     HERNIORRHAPHY INGUINAL BILATERAL       OB History   No data available       ROS:  CONSTITUTIONAL: NEGATIVE for fever, chills, change in weight  INTEGUMENTARY/SKIN: Reoccurring pigmented skin lesion over the pubic area that was excised x2 in the past 5 years  EYES: NEGATIVE for vision changes or irritation  ENT: NEGATIVE for ear, mouth and throat problems  RESP: NEGATIVE for significant cough or SOB  CV: NEGATIVE for chest pain, palpitations or peripheral edema  GI: NEGATIVE for nausea, abdominal pain, heartburn, or change in bowel habits   male: negative for dysuria, hematuria, decreased urinary stream, erectile dysfunction, urethral  "discharge  MUSCULOSKELETAL: NEGATIVE for significant arthralgias or myalgia  NEURO: NEGATIVE for weakness, dizziness or paresthesias  ENDOCRINE: NEGATIVE for temperature intolerance, skin/hair changes  HEME/ALLERGY/IMMUNE: NEGATIVE for bleeding problems  PSYCHIATRIC: NEGATIVE for changes in mood or affect    OBJECTIVE:   /85 (BP Location: Right arm, Patient Position: Sitting, Cuff Size: Adult Large)   Pulse 69   Temp 97.8  F (36.6  C) (Oral)   Ht 1.81 m (5' 11.25\")   Wt 90 kg (198 lb 6.4 oz)   SpO2 98%   BMI 27.48 kg/m    EXAM:  GENERAL: healthy, alert and no distress  EYES: Eyes grossly normal to inspection, PERRL and conjunctivae and sclerae normal  HENT: ear canals and TM's normal, nose and mouth without ulcers or lesions  NECK: no adenopathy, no asymmetry, masses, or scars and thyroid normal to palpation  RESP: lungs clear to auscultation - no rales, rhonchi or wheezes  CV: regular rate and rhythm, normal S1 S2, no S3 or S4, no murmur, click or rub, no peripheral edema and peripheral pulses strong  ABDOMEN: soft, nontender, no hepatosplenomegaly, no masses and bowel sounds normal  MS: no gross musculoskeletal defects noted, no edema  SKIN: About 1 cm, oval, raised pigmented skin lesion over the pubic area  NEURO: Normal strength and tone, mentation intact and speech normal  PSYCH: mentation appears normal, affect normal/bright    Diagnostic Test Results:  Labs reviewed in Epic    ASSESSMENT/PLAN:   1. Routine general medical examination at a health care facility  : Discussed on regular exercises, healthy eating, self testicular exams  and routine dental checks.    - TSH with free T4 reflex  - Hemoglobin A1c  - US Abdominal Aorta Imaging; Future  - HIV Antigen Antibody Combo    2. Screening for thyroid disorder    - TSH with free T4 reflex    3. Screening for diabetes mellitus (DM)    - Hemoglobin A1c    4. Family history of abdominal aortic aneurysm (AAA)  Patient understands to check with insurance " for coverage before getting it done  - US Abdominal Aorta Imaging; Future    5. Screening for AAA (abdominal aortic aneurysm)    - US Abdominal Aorta Imaging; Future    6. Screening for human immunodeficiency virus    - HIV Antigen Antibody Combo    7. Pigmented skin lesion  Recommended to consult dermatology for further evaluation due to previous history of excisions x3  - DERMATOLOGY REFERRAL    8. CARDIOVASCULAR SCREENING; LDL GOAL LESS THAN 160  LDL Cholesterol Calculated   Date Value Ref Range Status   2019 145 (H) <100 mg/dL Final     Comment:     Above desirable:  100-129 mg/dl  Borderline High:  130-159 mg/dL  High:             160-189 mg/dL  Very high:       >189 mg/dl           9. Family history of prostate cancer in father  Continue annual PSA screening    10. Overweight (BMI 25.0-29.9)  Wt Readings from Last 5 Encounters:   19 90 kg (198 lb 6.4 oz)   18 89.9 kg (198 lb 3.2 oz)   17 94.2 kg (207 lb 11.2 oz)   17 86.6 kg (191 lb)   16 86.2 kg (190 lb)     Emphasized on weight loss, portion control, low calorie and low fat diet, healthy eating, regular exercises.    11. Elevated bilirubin  Comment:   Plan: Hepatic panel (Albumin, ALT, AST, Bili, Alk         Phos, TP)        Reviewed slightly elevated bilirubin likely from current fasting  Reviewed normal liver enzymes, recommended to repeat the LFT in 2 to 3 weeks while he is not fasting  Will f/u on results and call with recommendations.  Patient verbalised understanding and is agreeable to the plan.          COUNSELING:  Reviewed preventive health counseling, as reflected in patient instructions  Special attention given to:        Regular exercise       Healthy diet/nutrition       Vision screening       HIV screeninx in teen years, 1x in adult years, and at intervals if high risk       Prostate cancer screening       The 10-year ASCVD risk score (Sophiadaly WORTHY Jr., et al., 2013) is: 1.7%    Values used to calculate the  "score:      Age: 39 years      Sex: Male      Is Non- : No      Diabetic: No      Tobacco smoker: No      Systolic Blood Pressure: 119 mmHg      Is BP treated: No      HDL Cholesterol: 42 mg/dL      Total Cholesterol: 221 mg/dL    Estimated body mass index is 27.48 kg/m  as calculated from the following:    Height as of this encounter: 1.81 m (5' 11.25\").    Weight as of this encounter: 90 kg (198 lb 6.4 oz).    Weight management plan: Discussed healthy diet and exercise guidelines     reports that he has quit smoking. He has never used smokeless tobacco.      Counseling Resources:  ATP IV Guidelines  Pooled Cohorts Equation Calculator  FRAX Risk Assessment  ICSI Preventive Guidelines  Dietary Guidelines for Americans, 2010  USDA's MyPlate  ASA Prophylaxis  Lung CA Screening    Kris Bernardo MD  [facility]  Chart documentation done in part with Dragon Voice recognition Software. Although reviewed after completion, some word and grammatical error may remain.    "

## 2019-08-07 ENCOUNTER — TELEPHONE (OUTPATIENT)
Dept: PEDIATRICS | Facility: CLINIC | Age: 39
End: 2019-08-07

## 2019-08-07 NOTE — TELEPHONE ENCOUNTER
Spoke with patient regarding scheduling. Patient needs a non-fasting lab appointment for liver test per [place] around 8/15/19. Number to clinic and Mychart option given, please assist in scheduling once patient returns clinic call. Call dropped at end of conversation.     Call Center OKAY TO SCHEDULE.    Thanks,   Mable Franks  Primary Care   [facility] [place]

## 2019-08-09 ENCOUNTER — ANCILLARY PROCEDURE (OUTPATIENT)
Dept: ULTRASOUND IMAGING | Facility: CLINIC | Age: 39
End: 2019-08-09
Attending: FAMILY MEDICINE
Payer: COMMERCIAL

## 2019-08-09 DIAGNOSIS — Z00.00 ROUTINE GENERAL MEDICAL EXAMINATION AT A HEALTH CARE FACILITY: ICD-10-CM

## 2019-08-09 DIAGNOSIS — Z82.49 FAMILY HISTORY OF ABDOMINAL AORTIC ANEURYSM (AAA): ICD-10-CM

## 2019-08-09 DIAGNOSIS — Z13.6 SCREENING FOR AAA (ABDOMINAL AORTIC ANEURYSM): ICD-10-CM

## 2019-08-09 PROCEDURE — 76775 US EXAM ABDO BACK WALL LIM: CPT | Performed by: RADIOLOGY

## 2019-08-09 NOTE — LETTER
August 10, 2019      Dakota France  [address] [address]              Dear Dakota,    Your scan showed no concerns for aortic aneurysm, this is reassuring and does not need further follow-up at this time.     Results for orders placed or performed in visit on 08/09/19   US Abdominal Aorta Imaging    Narrative    EXAMINATION: US ABDOMINAL AORTA LIMITED, 8/9/2019 10:02 AM     COMPARISON: CT 6/17/2016    HISTORY: Screening. Family history of right abdominal aortic aneurysm.    TECHNIQUE: Grey scale and color Doppler ultrasound of the abdominal  aorta.    FINDINGS: Examination of the abdominal aorta is performed.    Proximal abdominal aorta: 2.4 cm.  Mid abdominal aorta: 1.9 cm.  Distal abdominal aorta: 2 cm.    Proximal right common iliac artery is normal in caliber measuring 1.3  cm in maximal diameter.  Proximal left common iliac artery is normal in caliber measuring 1.1  cm in maximal diameter.      Impression    IMPRESSION:  1.  No evidence of an abdominal aortic aneurysm.    ------------------------------------------------------------  Aorta diameter measurement classification:  < 2.5cm: Normal.  2.5 - 2.9cm: Ectatic.  >3cm: Aneurysmal.    COSME SPIVEY MD       Sincerely,      Kris Bernardo MD/sk

## 2019-08-10 NOTE — RESULT ENCOUNTER NOTE
Please send letter with normal results.  Dear Dakota,  Your scan showed no concerns for aortic aneurysm, this is reassuring and does not need further follow-up at this time.  Kris Bernardo MD

## 2019-08-12 DIAGNOSIS — R17 ELEVATED BILIRUBIN: ICD-10-CM

## 2019-08-12 LAB
ALBUMIN SERPL-MCNC: 4.7 G/DL (ref 3.4–5)
ALP SERPL-CCNC: 67 U/L (ref 40–150)
ALT SERPL W P-5'-P-CCNC: 26 U/L (ref 0–70)
AST SERPL W P-5'-P-CCNC: 18 U/L (ref 0–45)
BILIRUB DIRECT SERPL-MCNC: 0.3 MG/DL (ref 0–0.2)
BILIRUB SERPL-MCNC: 1.8 MG/DL (ref 0.2–1.3)
PROT SERPL-MCNC: 8.1 G/DL (ref 6.8–8.8)

## 2019-08-12 PROCEDURE — 80076 HEPATIC FUNCTION PANEL: CPT | Performed by: FAMILY MEDICINE

## 2019-08-12 PROCEDURE — 36415 COLL VENOUS BLD VENIPUNCTURE: CPT | Performed by: FAMILY MEDICINE

## 2019-08-12 PROCEDURE — 83690 ASSAY OF LIPASE: CPT | Performed by: FAMILY MEDICINE

## 2019-08-12 PROCEDURE — 82150 ASSAY OF AMYLASE: CPT | Performed by: FAMILY MEDICINE

## 2019-08-13 ENCOUNTER — TELEPHONE (OUTPATIENT)
Dept: PEDIATRICS | Facility: CLINIC | Age: 39
End: 2019-08-13

## 2019-08-13 DIAGNOSIS — R17 ELEVATED BILIRUBIN: Primary | ICD-10-CM

## 2019-08-13 LAB
AMYLASE SERPL-CCNC: 58 U/L (ref 30–110)
LIPASE SERPL-CCNC: 89 U/L (ref 73–393)

## 2019-08-13 NOTE — TELEPHONE ENCOUNTER
Called patient, relayed message & patient verbalized understanding.   Denies alcohol, meds, ibuprofen/tylenol so he's wondering what else might cause this? He was surprised it was higher than 7/22.   Jocelyn Yañez RN

## 2019-08-13 NOTE — TELEPHONE ENCOUNTER
Left VM to call back to the clinic and asked if it's okay to leave a detailed VM.  Jocelyn Yañez RN

## 2019-08-13 NOTE — RESULT ENCOUNTER NOTE
Please call patient with test results-liver test showed slightly elevated bilirubin   This could be nonspecific given all other liver functions being in the normal range  I would recommend to have a non fasting lab recheck in 3 months.  We will consider abdominal ultrasound  for persistent or worsening concerns  Kris Bernardo MD

## 2019-08-13 NOTE — TELEPHONE ENCOUNTER
Left VM to call back to the clinic.  Jocelyn Yañez, RN     Please call patient with test results-liver test showed slightly elevated bilirubin   This could be nonspecific given all other liver functions being in the normal range   I would recommend to have a non fasting lab recheck in 3 months.   We will consider abdominal ultrasound  for persistent or worsening concerns   Kris Bernardo MD

## 2019-08-14 NOTE — TELEPHONE ENCOUNTER
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

## 2019-08-15 NOTE — TELEPHONE ENCOUNTER
Called patient, left a detailed message. Encouraged to call back with any questions.    Joyce Ross RN   [facility]

## 2019-10-02 ENCOUNTER — HEALTH MAINTENANCE LETTER (OUTPATIENT)
Age: 39
End: 2019-10-02

## 2019-10-08 ENCOUNTER — TELEPHONE (OUTPATIENT)
Dept: PEDIATRICS | Facility: CLINIC | Age: 39
End: 2019-10-08

## 2019-10-08 NOTE — TELEPHONE ENCOUNTER
[facility] Call Center    Phone Message    May a detailed message be left on voicemail: yes    Reason for Call: Other: Patient called and would like to know if labs on Friday are fasting or non fasting labs. Please call to advise.      Action Taken: Message routed to:  Primary Care p [phone]

## 2019-10-09 NOTE — TELEPHONE ENCOUNTER
Future order placed by Dr. Bernardo for Hepatic Panel. Left message notifying patient that he does not need to fast for lab appointment. Mirtha BOONE CMA

## 2019-10-11 DIAGNOSIS — R17 ELEVATED BILIRUBIN: ICD-10-CM

## 2019-10-11 LAB
ALBUMIN SERPL-MCNC: 4.4 G/DL (ref 3.4–5)
ALP SERPL-CCNC: 74 U/L (ref 40–150)
ALT SERPL W P-5'-P-CCNC: 26 U/L (ref 0–70)
AST SERPL W P-5'-P-CCNC: 16 U/L (ref 0–45)
BILIRUB DIRECT SERPL-MCNC: 0.2 MG/DL (ref 0–0.2)
BILIRUB SERPL-MCNC: 1.5 MG/DL (ref 0.2–1.3)
PROT SERPL-MCNC: 8.1 G/DL (ref 6.8–8.8)

## 2019-10-11 PROCEDURE — 87340 HEPATITIS B SURFACE AG IA: CPT | Performed by: FAMILY MEDICINE

## 2019-10-11 PROCEDURE — 80076 HEPATIC FUNCTION PANEL: CPT | Performed by: FAMILY MEDICINE

## 2019-10-11 PROCEDURE — 86709 HEPATITIS A IGM ANTIBODY: CPT | Performed by: FAMILY MEDICINE

## 2019-10-11 PROCEDURE — 36415 COLL VENOUS BLD VENIPUNCTURE: CPT | Performed by: FAMILY MEDICINE

## 2019-10-11 PROCEDURE — 86706 HEP B SURFACE ANTIBODY: CPT | Performed by: FAMILY MEDICINE

## 2019-10-11 PROCEDURE — 86803 HEPATITIS C AB TEST: CPT | Performed by: FAMILY MEDICINE

## 2019-10-14 LAB
HAV IGM SERPL QL IA: NONREACTIVE
HBV SURFACE AB SERPL IA-ACNC: 0.98 M[IU]/ML
HBV SURFACE AG SERPL QL IA: NONREACTIVE
HCV AB SERPL QL IA: NONREACTIVE

## 2019-10-14 NOTE — RESULT ENCOUNTER NOTE
Dear Dakota,  He will your function test showed persistently elevated bilirubin which is improved from the last lab.  Your hepatitis screening test results are negative, this is reassuring.  I would recommend to have this monitored annually or sooner if needed for concerns of abdominal pain, jaundice, skin itching, nausea and vomiting.   Let me know if you have any questions. Take care.  Kris Bernardo MD

## 2019-10-30 ENCOUNTER — MYC MEDICAL ADVICE (OUTPATIENT)
Dept: DERMATOLOGY | Facility: CLINIC | Age: 39
End: 2019-10-30

## 2019-11-12 ENCOUNTER — OFFICE VISIT (OUTPATIENT)
Dept: DERMATOLOGY | Facility: CLINIC | Age: 39
End: 2019-11-12
Attending: FAMILY MEDICINE
Payer: COMMERCIAL

## 2019-11-12 DIAGNOSIS — D48.9 NEOPLASM OF UNCERTAIN BEHAVIOR: Primary | ICD-10-CM

## 2019-11-12 PROCEDURE — 11102 TANGNTL BX SKIN SINGLE LES: CPT | Performed by: DERMATOLOGY

## 2019-11-12 PROCEDURE — 88305 TISSUE EXAM BY PATHOLOGIST: CPT | Mod: TC | Performed by: DERMATOLOGY

## 2019-11-12 ASSESSMENT — PAIN SCALES - GENERAL: PAINLEVEL: NO PAIN (0)

## 2019-11-12 NOTE — NURSING NOTE
Dakota France's goals for this visit include:   Chief Complaint   Patient presents with     Derm Problem     spot on lower mid abdomen- on belt line and has previously been removed 2-3 times [facility] signed        He requests these members of his care team be copied on today's visit information:     PCP: Kris Bernardo    Referring Provider:  Kris Bernardo MD  [address] [place] [address]    There were no vitals taken for this visit.    Do you need any medication refills at today's visit? Soumya Mccloud LPN

## 2019-11-12 NOTE — PROGRESS NOTES
"[facility] Dermatology Note    Dermatology Problem List:  1. NUB, central lower abdomen, s/p biopsy 11/12/19    Encounter Date: Nov 12, 2019    CC:  Chief Complaint   Patient presents with     Derm Problem     spot on lower mid abdomen- on belt line and has previously been removed 2-3 times     History of Present Illness:  Mr. Dakota France is a 39 year old male who presents as a referral from Kris Bernardo MD for a spot check. He is new to our clinic and has no personal history of skin cancer. No family history of skin cancer. Today he reports a spot of concern on his lower-mid abdomen near his belt line. This lesion has been previously removed 2-3 times at the [facility]. This was last treated \"7-10 years ago\". He was told it was benign every time it was removed. He cannot remember if the area was removed with a biopsy, excision, or freeze treatment. Denies any tender, nonhealing, bleeding skin lesions. No other concerns addressed today.       Past Medical History:   Patient Active Problem List   Diagnosis     H/O cardiac arrhythmia-PAC     Overweight (BMI 25.0-29.9)     CARDIOVASCULAR SCREENING; LDL GOAL LESS THAN 160     Skin tag     Family history of abdominal aortic aneurysm (AAA)     Family history of prostate cancer in father     Pigmented skin lesion     Elevated bilirubin     Past Medical History:   Diagnosis Date     PAC (premature atrial contraction)      Past Surgical History:   Procedure Laterality Date     HERNIORRHAPHY INGUINAL BILATERAL       Social History:  Patient is .     Family History:  No family history of skin cancer    Medications:  No current outpatient medications on file.     No Known Allergies    Review of Systems:  -Constitutional: Patient is otherwise feeling well, in usual state of health.   -Skin: As above in HPI. No additional skin concerns.    Physical exam:  Vitals: There were no vitals taken for this visit.  GEN: This is a well " developed, well-nourished male in no acute distress, in a pleasant mood.    SKIN: Focused examination of the head/face, abdomen was performed.  - Dillon Type II  - NUB: central lower abdomen there is a dark brown, oval shaped papule about 1.cm in size. Most consistent with SK clinically.  - No other lesions of concern on areas examined.         Impression/Plan:    1. Neoplasm of uncertain behavior on the central lower abdomen. The differential diagnosis includes SK vs congenital nevus vs Bowenoid papulosis. Clinically, this lesion looks like a SK but the patient's history of prior attempts at removal and recurrence is atypical for this. Will biopsy today both to remove and to get definitive diagnosis.     Shave biopsy:  After discussion of benefits and risks including but not limited to bleeding/bruising, pain/swelling, infection, scar, incomplete removal, nerve damage/numbness, recurrence, and non-diagnostic biopsy, written consent, verbal consent and photographs were obtained. Time-out was performed. The area was cleaned with isopropyl alcohol. 0.5ml of 1% lidocaine with 1:100,000 epinephrine was injected to obtain adequate anesthesia. A shave biopsy was performed. Hemostasis was achieved with aluminium chloride and cautery. Vaseline and a sterile dressing were applied. The patient tolerated the procedure and no complications were noted. The patient was provided with verbal and written post care instructions.    CC Kris Bernardo MD on close of this encounter.  Follow-up PRN for skin check, earlier for new or changing lesions.     Staff Involved:  Scribe/Staff    Scribe Disclosure  I, Kaitlyn Basurto, am serving as a scribe to document services personally performed by Dr. Sangeeta Villafana MD, based on data collection and the provider's statements to me.     Provider Disclosure:   The documentation recorded by the scribe accurately reflects the services I personally performed and the decisions made by  me.    Sangeeta Villafana MD    Department of Dermatology  [facility]: Phone: [phone], Fax:[phone]  [facility]: Phone: [phone], Fax: [phone]

## 2019-11-12 NOTE — PATIENT INSTRUCTIONS

## 2019-11-12 NOTE — NURSING NOTE
The following medication was given:     MEDICATION:  Lidocaine with epinephrine 1% 1:[id number]  ROUTE: [place]  SITE: see procedure note  DOSE: 0.5cc    LOT #: -[id number]  : Jose Manuel  EXPIRATION DATE: 07/01/2020  NDC#: 2726-6622-32     Was there drug waste? Yes, 1.5cc's  Multi-dose vial: Yes    Neha Koroma LPN  November 12, 2019

## 2019-11-12 NOTE — LETTER
"    11/12/2019         RE: Dakota France  [address] [place] [id number]        Dear Colleague,    Thank you for referring your patient, Dakota France, to the [facility]. Please see a copy of my visit note below.    [facility] Dermatology Note    Dermatology Problem List:  1. NUB, central lower abdomen, s/p biopsy 11/12/19    Encounter Date: Nov 12, 2019    CC:  Chief Complaint   Patient presents with     Derm Problem     spot on lower mid abdomen- on belt line and has previously been removed 2-3 times     History of Present Illness:  Mr. Dakota France is a 39 year old male who presents as a referral from Kris Bernardo MD for a spot check. He is new to our clinic and has no personal history of skin cancer. No family history of skin cancer. Today he reports a spot of concern on his lower-mid abdomen near his belt line. This lesion has been previously removed 2-3 times at the [facility]. This was last treated \"7-10 years ago\". He was told it was benign every time it was removed. He cannot remember if the area was removed with a biopsy, excision, or freeze treatment. Denies any tender, nonhealing, bleeding skin lesions. No other concerns addressed today.       Past Medical History:   Patient Active Problem List   Diagnosis     H/O cardiac arrhythmia-PAC     Overweight (BMI 25.0-29.9)     CARDIOVASCULAR SCREENING; LDL GOAL LESS THAN 160     Skin tag     Family history of abdominal aortic aneurysm (AAA)     Family history of prostate cancer in father     Pigmented skin lesion     Elevated bilirubin     Past Medical History:   Diagnosis Date     PAC (premature atrial contraction)      Past Surgical History:   Procedure Laterality Date     HERNIORRHAPHY INGUINAL BILATERAL       Social History:  Patient is .     Family History:  No family history of skin cancer    Medications:  No current outpatient medications on file.     No Known " Allergies    Review of Systems:  -Constitutional: Patient is otherwise feeling well, in usual state of health.   -Skin: As above in HPI. No additional skin concerns.    Physical exam:  Vitals: There were no vitals taken for this visit.  GEN: This is a well developed, well-nourished male in no acute distress, in a pleasant mood.    SKIN: Focused examination of the head/face, abdomen was performed.  - Dillon Type II  - NUB: central lower abdomen there is a dark brown, oval shaped papule about 1.cm in size. Most consistent with SK clinically.  - No other lesions of concern on areas examined.         Impression/Plan:    1. Neoplasm of uncertain behavior on the central lower abdomen. The differential diagnosis includes SK vs congenital nevus vs Bowenoid papulosis. Clinically, this lesion looks like a SK but the patient's history of prior attempts at removal and recurrence is atypical for this. Will biopsy today both to remove and to get definitive diagnosis.     Shave biopsy:  After discussion of benefits and risks including but not limited to bleeding/bruising, pain/swelling, infection, scar, incomplete removal, nerve damage/numbness, recurrence, and non-diagnostic biopsy, written consent, verbal consent and photographs were obtained. Time-out was performed. The area was cleaned with isopropyl alcohol. 0.5ml of 1% lidocaine with 1:100,000 epinephrine was injected to obtain adequate anesthesia. A shave biopsy was performed. Hemostasis was achieved with aluminium chloride and cautery. Vaseline and a sterile dressing were applied. The patient tolerated the procedure and no complications were noted. The patient was provided with verbal and written post care instructions.    CC Kris Bernardo MD on close of this encounter.  Follow-up PRN for skin check, earlier for new or changing lesions.     Staff Involved:  Scribe/Staff    Scribe Disclosure  I, Kaitlyn Basurto, am serving as a scribe to document services  personally performed by Dr. Sangeeta Villafana MD, based on data collection and the provider's statements to me.     Provider Disclosure:   The documentation recorded by the scribe accurately reflects the services I personally performed and the decisions made by me.    Sangeeta Villafana MD    Department of Dermatology  [facility]: Phone: [phone], Fax:[phone]  [facility]: Phone: [phone], Fax: [phone]                Again, thank you for allowing me to participate in the care of your patient.        Sincerely,        Sangeeta Villafana MD

## 2019-11-14 LAB — COPATH REPORT: NORMAL

## 2020-06-29 ENCOUNTER — ANCILLARY PROCEDURE (OUTPATIENT)
Dept: ULTRASOUND IMAGING | Facility: CLINIC | Age: 40
End: 2020-06-29
Attending: FAMILY MEDICINE
Payer: COMMERCIAL

## 2020-06-29 DIAGNOSIS — R17 ELEVATED BILIRUBIN: ICD-10-CM

## 2020-06-29 PROBLEM — K82.4 CHOLESTEROLOSIS GALLBLADDER: Status: ACTIVE | Noted: 2020-06-29

## 2020-06-29 PROCEDURE — 76700 US EXAM ABDOM COMPLETE: CPT | Performed by: RADIOLOGY

## 2020-06-29 NOTE — RESULT ENCOUNTER NOTE
Melo Duncan,  Your abdominal scan showed no concerns for fatty liver or gallbladder stones, this is good.  Your slightly elevated bilirubin could be from mild enzyme deficiency that processes the bilirubin in the liver, which is a very common benign condition.  We will continue to monitor the bilirubin on an annual basis or more frequently if needed.  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  This is an  incidental finding and in the absence of symptoms like bloating or pain in the  Abdomen, jaundice, nausea, vomiting, this finding is nonspecific and does not need further immediate follow-up.   Let me know if you have any questions. Take care.  Kris Bernardo MD

## 2021-01-15 ENCOUNTER — HEALTH MAINTENANCE LETTER (OUTPATIENT)
Age: 41
End: 2021-01-15

## 2021-04-26 ENCOUNTER — TRANSFERRED RECORDS (OUTPATIENT)
Dept: HEALTH INFORMATION MANAGEMENT | Facility: CLINIC | Age: 41
End: 2021-04-26

## 2021-07-06 ENCOUNTER — TRANSFERRED RECORDS (OUTPATIENT)
Dept: HEALTH INFORMATION MANAGEMENT | Facility: CLINIC | Age: 41
End: 2021-07-06

## 2021-07-12 ENCOUNTER — TRANSFERRED RECORDS (OUTPATIENT)
Dept: HEALTH INFORMATION MANAGEMENT | Facility: CLINIC | Age: 41
End: 2021-07-12

## 2021-07-26 ENCOUNTER — TRANSFERRED RECORDS (OUTPATIENT)
Dept: HEALTH INFORMATION MANAGEMENT | Facility: CLINIC | Age: 41
End: 2021-07-26

## 2021-08-20 NOTE — PROGRESS NOTES
SUBJECTIVE:   CC: Dakota France is an 41 year old male who presents for preventative health visit.       Patient has been advised of split billing requirements and indicates understanding: No  Healthy Habits:     Getting at least 3 servings of Calcium per day:  Yes    Bi-annual eye exam:  Yes    Dental care twice a year:  NO    Sleep apnea or symptoms of sleep apnea:  None    Diet:  Regular (no restrictions)    Frequency of exercise:  2-3 days/week    Duration of exercise:  15-30 minutes    Taking medications regularly:  Yes    Medication side effects:  None    PHQ-2 Total Score: 0    Additional concerns today:  No    Ability to successfully perform activities of daily living: Yes, no assistance needed  Home safety:  none identified               Today's PHQ-2 Score:   PHQ-2 ( 1999 [employer]) 8/23/2021   Q1: Little interest or pleasure in doing things 0   Q2: Feeling down, depressed or hopeless 0   PHQ-2 Score 0   Q1: Little interest or pleasure in doing things Not at all   Q2: Feeling down, depressed or hopeless Not at all   PHQ-2 Score 0       Abuse: Current or Past(Physical, Sexual or Emotional)- No  Do you feel safe in your environment? Yes    Have you ever done Advance Care Planning? (For example, a Health Directive, POLST, or a discussion with a medical provider or your loved ones about your wishes): No, advance care planning information given to patient to review.  Patient plans to discuss their wishes with loved ones or provider.      Social History     Tobacco Use     Smoking status: Former Smoker     Smokeless tobacco: Never Used   Substance Use Topics     Alcohol use: Yes     If you drink alcohol do you typically have >3 drinks per day or >7 drinks per week? No    Alcohol Use 8/23/2021   Prescreen: >3 drinks/day or >7 drinks/week? No   Prescreen: >3 drinks/day or >7 drinks/week? -   No flowsheet data found.    Last PSA:   PSA   Date Value Ref Range Status   07/22/2019 1.35 0 - 4 ug/L Final     Comment:      Assay Method:  Chemiluminescence using Siemens Vista analyzer       Reviewed orders with patient. Reviewed health maintenance and updated orders accordingly - Yes  Lab work is in process  Labs reviewed in EPIC  BP Readings from Last 3 Encounters:   08/23/21 122/79   07/22/19 119/85   04/11/18 128/72    Wt Readings from Last 3 Encounters:   08/23/21 85.1 kg (187 lb 11.2 oz)   07/22/19 90 kg (198 lb 6.4 oz)   04/11/18 89.9 kg (198 lb 3.2 oz)                  Patient Active Problem List   Diagnosis     H/O cardiac arrhythmia-PAC     Overweight (BMI 25.0-29.9)     CARDIOVASCULAR SCREENING; LDL GOAL LESS THAN 160     Skin tag     Family history of abdominal aortic aneurysm (AAA)     Family history of prostate cancer in father     Pigmented skin lesion     Elevated bilirubin     Cholesterolosis gallbladder     Digital mucous cyst of toe of left foot     Bilateral shoulder pain, unspecified chronicity     Seborrheic keratosis     Past Surgical History:   Procedure Laterality Date     HERNIORRHAPHY INGUINAL BILATERAL         Social History     Tobacco Use     Smoking status: Former Smoker     Smokeless tobacco: Never Used   Substance Use Topics     Alcohol use: Yes     Family History   Problem Relation Age of Onset     Prostate Cancer Father      Abdominal Aortic Aneurysm Father 70     Prostate Problems Father          No current outpatient medications on file.     No Known Allergies  Recent Labs   Lab Test 10/11/19  0859 08/12/19  1341 07/22/19  0945 05/11/15  0741 04/27/15  1452   A1C  --   --  5.3  --   --    LDL  --   --  145* 137*  --    HDL  --   --  42 43  --    TRIG  --   --  171* 145  --    ALT 26 26 21  --   --    CR  --   --  0.97  --  1.06   GFRESTIMATED  --   --  >90  --  78   GFRESTBLACK  --   --  >90  --  >90  African American GFR Calc     POTASSIUM  --   --  4.1  --  3.9   TSH  --   --  1.41  --  2.37        Reviewed and updated as needed this visit by clinical staff  Tobacco  Allergies  Meds   Med Hx   Surg Hx  Fam Hx  Soc Hx        Reviewed and updated as needed this visit by Provider                  Past Medical History:   Diagnosis Date     PAC (premature atrial contraction)       Past Surgical History:   Procedure Laterality Date     HERNIORRHAPHY INGUINAL BILATERAL       OB History   No obstetric history on file.       Review of Systems   Constitutional: Negative for chills and fever.   HENT: Negative for congestion, ear pain, hearing loss and sore throat.    Eyes: Negative for pain and visual disturbance.   Respiratory: Negative for cough and shortness of breath.    Cardiovascular: Negative for chest pain, palpitations and peripheral edema.   Gastrointestinal: Negative for abdominal pain, constipation, diarrhea, heartburn, hematochezia and nausea.   Genitourinary: Negative for discharge, dysuria, frequency, genital sores, hematuria, impotence and urgency.   Musculoskeletal: Positive for myalgias. Negative for arthralgias and joint swelling.   Skin: Negative for rash.   Neurological: Negative for dizziness, weakness, headaches and paresthesias.   Psychiatric/Behavioral: Negative for mood changes. The patient is not nervous/anxious.      CONSTITUTIONAL: NEGATIVE for fever, chills, change in weight  INTEGUMENTARY/SKIN: Reoccurring skin lesion in the suprapubic area s/p excision, and biopsy showing seborrheic keratosis in 2019  EYES: NEGATIVE for vision changes or irritation  ENT: NEGATIVE for ear, mouth and throat problems  RESP: NEGATIVE for significant cough or SOB  CV: NEGATIVE for chest pain, palpitations or peripheral edema  GI: NEGATIVE for nausea, abdominal pain, heartburn, or change in bowel habits   male: negative for dysuria, hematuria, decreased urinary stream, erectile dysfunction, urethral discharge  MUSCULOSKELETAL: Bilateral shoulder pain, was seen by [facility]  Cystic lesion on the left second toe  NEURO: NEGATIVE for weakness, dizziness or paresthesias  ENDOCRINE: NEGATIVE for temperature  "intolerance, skin/hair changes  HEME/ALLERGY/IMMUNE: NEGATIVE for bleeding problems  PSYCHIATRIC: NEGATIVE for changes in mood or affect    OBJECTIVE:   /79 (BP Location: Right arm, Patient Position: Sitting, Cuff Size: Adult Regular)   Pulse 71   Temp 98.5  F (36.9  C) (Oral)   Resp 18   Ht 1.791 m (5' 10.5\")   Wt 85.1 kg (187 lb 11.2 oz)   SpO2 100%   BMI 26.55 kg/m      Physical Exam  GENERAL: healthy, alert and no distress  EYES: Eyes grossly normal to inspection, PERRL and conjunctivae and sclerae normal  HENT: ear canals and TM's normal, nose and mouth without ulcers or lesions  NECK: no adenopathy, no asymmetry, masses, or scars and thyroid normal to palpation  RESP: lungs clear to auscultation - no rales, rhonchi or wheezes  CV: regular rate and rhythm, normal S1 S2, no S3 or S4, no murmur, click or rub, no peripheral edema and peripheral pulses strong  ABDOMEN: soft, nontender, no hepatosplenomegaly, no masses and bowel sounds normal  MS: no gross musculoskeletal defects noted, no edema  SKIN: Brown keratotic skin lesion in the suprapubic area where belt buckle sits  About 3 to 4 mm, cystic lesion on the DIP joint of left second toe  NEURO: Normal strength and tone, mentation intact and speech normal  PSYCH: mentation appears normal, affect normal/bright    Diagnostic Test Results:  Labs reviewed in Epic    ASSESSMENT/PLAN:   (Z00.00) Routine general medical examination at a health care facility  (primary encounter diagnosis)  Comment:   Plan: : Discussed on regular exercises, healthy eating,  and routine dental checks.    (R17) Elevated bilirubin  Comment:   Plan: Hepatic panel (Albumin, ALT, AST, Bili, Alk         Phos, TP)            (Z80.42) Family history of prostate cancer in father  Comment:   Plan: PSA, screen            (Z12.5) Prostate cancer screening  Comment:   Plan: PSA, screen            (M67.472) Digital mucous cyst of toe of left foot  Comment:   Plan: Patient was seen by [facility] " "orthopedist in July 2021  Was given the suggestion for surgical excision  Recommended to follow-up with them for further management    (M25.511,  M25.512) Bilateral shoulder pain, unspecified chronicity  Comment:   Plan: Continue to follow-up with [facility] orthopedist, had MRI      (L82.1) Seborrheic keratosis  Comment:   Plan: S/p excisional biopsy in 2019 from dermatology at [place] showing seborrheic keratosis  Lesions have reoccurred due to constant irritation, of that area of skin against the belt buckle  Recommended to cover the affected part of the skin with a Band-Aid so to avoid rubbing against the metal belt  Follow-up with dermatology  Or with us for persistent or worsening concerns  Patient verbalised understanding and is agreeable to the plan.      Patient has been advised of split billing requirements and indicates understanding: Yes  COUNSELING:   Reviewed preventive health counseling, as reflected in patient instructions  Special attention given to:        Regular exercise       Healthy diet/nutrition       Vision screening       Prostate cancer screening       The 10-year ASCVD risk score (Louisvilledaly WORTHY Jr., et al., 2013) is: 2.2%    Values used to calculate the score:      Age: 41 years      Sex: Male      Is Non- : No      Diabetic: No      Tobacco smoker: No      Systolic Blood Pressure: 122 mmHg      Is BP treated: No      HDL Cholesterol: 42 mg/dL      Total Cholesterol: 221 mg/dL    Estimated body mass index is 26.55 kg/m  as calculated from the following:    Height as of this encounter: 1.791 m (5' 10.5\").    Weight as of this encounter: 85.1 kg (187 lb 11.2 oz).     Weight management plan: Discussed healthy diet and exercise guidelines Appreciated patient's efforts on weight loss continue with healthy eating, portion control and regular exercises,    He reports that he has quit smoking. He has never used smokeless tobacco.      Counseling Resources:  ATP IV " Guidelines  Pooled Cohorts Equation Calculator  FRAX Risk Assessment  ICSI Preventive Guidelines  Dietary Guidelines for Americans, 2010  USDA's MyPlate  ASA Prophylaxis  Lung CA Screening    Kris Bernardo MD  [facility]  Chart documentation done in part with Dragon Voice recognition Software. Although reviewed after completion, some word and grammatical error may remain.

## 2021-08-23 ENCOUNTER — OFFICE VISIT (OUTPATIENT)
Dept: FAMILY MEDICINE | Facility: CLINIC | Age: 41
End: 2021-08-23
Payer: COMMERCIAL

## 2021-08-23 VITALS
DIASTOLIC BLOOD PRESSURE: 79 MMHG | BODY MASS INDEX: 26.28 KG/M2 | RESPIRATION RATE: 18 BRPM | HEIGHT: 71 IN | SYSTOLIC BLOOD PRESSURE: 122 MMHG | WEIGHT: 187.7 LBS | HEART RATE: 71 BPM | TEMPERATURE: 98.5 F | OXYGEN SATURATION: 100 %

## 2021-08-23 DIAGNOSIS — Z12.5 PROSTATE CANCER SCREENING: ICD-10-CM

## 2021-08-23 DIAGNOSIS — M67.472 DIGITAL MUCOUS CYST OF TOE OF LEFT FOOT: ICD-10-CM

## 2021-08-23 DIAGNOSIS — M25.511 BILATERAL SHOULDER PAIN, UNSPECIFIED CHRONICITY: ICD-10-CM

## 2021-08-23 DIAGNOSIS — Z00.00 ROUTINE GENERAL MEDICAL EXAMINATION AT A HEALTH CARE FACILITY: Primary | ICD-10-CM

## 2021-08-23 DIAGNOSIS — M25.512 BILATERAL SHOULDER PAIN, UNSPECIFIED CHRONICITY: ICD-10-CM

## 2021-08-23 DIAGNOSIS — R17 ELEVATED BILIRUBIN: ICD-10-CM

## 2021-08-23 DIAGNOSIS — L82.1 SEBORRHEIC KERATOSIS: ICD-10-CM

## 2021-08-23 DIAGNOSIS — Z80.42 FAMILY HISTORY OF PROSTATE CANCER IN FATHER: ICD-10-CM

## 2021-08-23 LAB
ALBUMIN SERPL-MCNC: 4.2 G/DL (ref 3.4–5)
ALP SERPL-CCNC: 65 U/L (ref 40–150)
ALT SERPL W P-5'-P-CCNC: 23 U/L (ref 0–70)
AST SERPL W P-5'-P-CCNC: 16 U/L (ref 0–45)
BILIRUB DIRECT SERPL-MCNC: 0.2 MG/DL (ref 0–0.2)
BILIRUB SERPL-MCNC: 1.4 MG/DL (ref 0.2–1.3)
PROT SERPL-MCNC: 7.3 G/DL (ref 6.8–8.8)
PSA SERPL-MCNC: 1.04 UG/L (ref 0–4)

## 2021-08-23 PROCEDURE — 36415 COLL VENOUS BLD VENIPUNCTURE: CPT | Performed by: FAMILY MEDICINE

## 2021-08-23 PROCEDURE — 80076 HEPATIC FUNCTION PANEL: CPT | Performed by: FAMILY MEDICINE

## 2021-08-23 PROCEDURE — 99396 PREV VISIT EST AGE 40-64: CPT | Performed by: FAMILY MEDICINE

## 2021-08-23 PROCEDURE — G0103 PSA SCREENING: HCPCS | Performed by: FAMILY MEDICINE

## 2021-08-23 ASSESSMENT — ENCOUNTER SYMPTOMS
JOINT SWELLING: 0
PALPITATIONS: 0
WEAKNESS: 0
DIARRHEA: 0
MYALGIAS: 1
HEARTBURN: 0
ARTHRALGIAS: 0
PARESTHESIAS: 0
NAUSEA: 0
FEVER: 0
HEMATURIA: 0
FREQUENCY: 0
SHORTNESS OF BREATH: 0
DIZZINESS: 0
SORE THROAT: 0
COUGH: 0
HEADACHES: 0
CHILLS: 0
EYE PAIN: 0
DYSURIA: 0
NERVOUS/ANXIOUS: 0
HEMATOCHEZIA: 0
CONSTIPATION: 0
ABDOMINAL PAIN: 0

## 2021-08-23 ASSESSMENT — PAIN SCALES - GENERAL: PAINLEVEL: NO PAIN (0)

## 2021-08-23 ASSESSMENT — MIFFLIN-ST. JEOR: SCORE: 1760.59

## 2021-08-24 NOTE — RESULT ENCOUNTER NOTE
Melo Duncan,  Your lab tests were normal PSA-prostate cancer screening test and slightly elevated but improved bilirubin.  These are reassuring.   Let me know if you have any questions. Take care.  Kris Bernardo MD

## 2021-09-05 ENCOUNTER — HEALTH MAINTENANCE LETTER (OUTPATIENT)
Age: 41
End: 2021-09-05

## 2021-12-27 ENCOUNTER — TRANSFERRED RECORDS (OUTPATIENT)
Dept: HEALTH INFORMATION MANAGEMENT | Facility: CLINIC | Age: 41
End: 2021-12-27
Payer: COMMERCIAL

## 2022-01-12 ENCOUNTER — TELEPHONE (OUTPATIENT)
Dept: FAMILY MEDICINE | Facility: CLINIC | Age: 42
End: 2022-01-12
Payer: COMMERCIAL

## 2022-01-12 NOTE — TELEPHONE ENCOUNTER
LVM notified patient provider out of office and to reschedule preop with another provider. Offered reschedule through MyChart or clinic scheduling [phone].

## 2022-02-08 ENCOUNTER — OFFICE VISIT (OUTPATIENT)
Dept: FAMILY MEDICINE | Facility: CLINIC | Age: 42
End: 2022-02-08
Payer: COMMERCIAL

## 2022-02-08 VITALS
SYSTOLIC BLOOD PRESSURE: 134 MMHG | RESPIRATION RATE: 18 BRPM | WEIGHT: 192.2 LBS | TEMPERATURE: 97.3 F | BODY MASS INDEX: 26.91 KG/M2 | HEIGHT: 71 IN | DIASTOLIC BLOOD PRESSURE: 88 MMHG | OXYGEN SATURATION: 98 % | HEART RATE: 67 BPM

## 2022-02-08 DIAGNOSIS — M20.5X2 ACQUIRED MALLET TOE OF LEFT FOOT: ICD-10-CM

## 2022-02-08 DIAGNOSIS — Z01.818 PREOPERATIVE CLEARANCE: Primary | ICD-10-CM

## 2022-02-08 DIAGNOSIS — M67.479 MUCOUS CYST OF TOE: ICD-10-CM

## 2022-02-08 LAB
ERYTHROCYTE [DISTWIDTH] IN BLOOD BY AUTOMATED COUNT: 12.1 % (ref 10–15)
HCT VFR BLD AUTO: 46.5 % (ref 40–53)
HGB BLD-MCNC: 15.7 G/DL (ref 13.3–17.7)
MCH RBC QN AUTO: 30.4 PG (ref 26.5–33)
MCHC RBC AUTO-ENTMCNC: 33.8 G/DL (ref 31.5–36.5)
MCV RBC AUTO: 90 FL (ref 78–100)
PLATELET # BLD AUTO: 198 10E3/UL (ref 150–450)
RBC # BLD AUTO: 5.16 10E6/UL (ref 4.4–5.9)
WBC # BLD AUTO: 5.4 10E3/UL (ref 4–11)

## 2022-02-08 PROCEDURE — 80053 COMPREHEN METABOLIC PANEL: CPT | Performed by: INTERNAL MEDICINE

## 2022-02-08 PROCEDURE — 99214 OFFICE O/P EST MOD 30 MIN: CPT | Performed by: INTERNAL MEDICINE

## 2022-02-08 PROCEDURE — 36415 COLL VENOUS BLD VENIPUNCTURE: CPT | Performed by: INTERNAL MEDICINE

## 2022-02-08 PROCEDURE — 85027 COMPLETE CBC AUTOMATED: CPT | Performed by: INTERNAL MEDICINE

## 2022-02-08 RX ORDER — COVID-19 MOLECULAR TEST ASSAY
KIT MISCELLANEOUS
COMMUNITY
Start: 2021-10-21 | End: 2023-01-13

## 2022-02-08 ASSESSMENT — MIFFLIN-ST. JEOR: SCORE: 1776

## 2022-02-08 ASSESSMENT — PAIN SCALES - GENERAL: PAINLEVEL: NO PAIN (0)

## 2022-02-08 NOTE — PATIENT INSTRUCTIONS
As discussed, yours is a low risk surgery . Will do required work up and send clearance to your surgeon.     ====================    Preparing for Your Surgery  Getting started  A nurse will call you to review your health history and instructions. They will give you an arrival time based on your scheduled surgery time. Please be ready to share:    Your doctor's clinic name and phone number    Your medical, surgical and anesthesia history    A list of allergies and sensitivities    A list of medicines, including herbal treatments and over-the-counter drugs    Whether the patient has a legal guardian (ask how to send us the papers in advance)  Please tell us if you're pregnant--or if there's any chance you might be pregnant. Some surgeries may injure a fetus (unborn baby), so they require a pregnancy test. Surgeries that are safe for a fetus don't always need a test, and you can choose whether to have one.   If you have a child who's having surgery, please ask for a copy of Preparing for Your Child's Surgery.    Preparing for surgery    Within 30 days of surgery: Have a pre-op exam (sometimes called an H&P, or History and Physical). This can be done at a clinic or pre-operative center.  ? If you're having a , you may not need this exam. Talk to your care team.    At your pre-op exam, talk to your care team about all medicines you take. If you need to stop any medicines before surgery, ask when to start taking them again.  ? We do this for your safety. Many medicines can make you bleed too much during surgery. Some change how well surgery (anesthesia) drugs work.    Call your insurance company to let them know you're having surgery. (If you don't have insurance, call [phone].)    Call your clinic if there's any change in your health. This includes signs of a cold or flu (sore throat, runny nose, cough, rash, fever). It also includes a scrape or scratch near the surgery site.    If you have questions on  the day of surgery, call your hospital or surgery center.  COVID testing  You may need to be tested for COVID-19 before having surgery. If so, your surgical team will give you instructions for scheduling this test, separate from your preoperative history and physical.  Eating and drinking guidelines  For your safety: Unless your surgeon tells you otherwise, follow the guidelines below.    Eat and drink as usual until 8 hours before surgery. After that, no food or milk.    Drink clear liquids until 2 hours before surgery. These are liquids you can see through, like water, Gatorade and Propel Water. You may also have black coffee and tea (no cream or milk).    Nothing by mouth within 2 hours of surgery. This includes gum, candy and breath mints.    If you drink alcohol: Stop drinking it the night before surgery.    If your care team tells you to take medicine on the morning of surgery, it's okay to take it with a sip of water.  Preventing infection    Shower or bathe the night before and morning of your surgery. Follow the instructions your clinic gave you. (If no instructions, use regular soap.)    Don't shave or clip hair near your surgery site. We'll remove the hair if needed.    Don't smoke or vape the morning of surgery. You may chew nicotine gum up to 2 hours before surgery. A nicotine patch is okay.  ? Note: Some surgeries require you to completely quit smoking and nicotine. Check with your surgeon.    Your care team will make every effort to keep you safe from infection. We will:  ? Clean our hands often with soap and water (or an alcohol-based hand rub).  ? Clean the skin at your surgery site with a special soap that kills germs.  ? Give you a special gown to keep you warm. (Cold raises the risk of infection.)  ? Wear special hair covers, masks, gowns and gloves during surgery.  ? Give antibiotic medicine, if prescribed. Not all surgeries need antibiotics.  What to bring on the day of surgery    Photo ID and  insurance card    Copy of your health care directive, if you have one    Glasses and hearing aides (bring cases)  ? You can't wear contacts during surgery    Inhaler and eye drops, if you use them (tell us about these when you arrive)    CPAP machine or breathing device, if you use them    A few personal items, if spending the night    If you have . . .  ? A pacemaker, ICD (cardiac defibrillator) or other implant: Bring the ID card.  ? An implanted stimulator: Bring the remote control.  ? A legal guardian: Bring a copy of the certified (court-stamped) guardianship papers.  Please remove any jewelry, including body piercings. Leave jewelry and other valuables at home.  If you're going home the day of surgery    You must have a responsible adult drive you home. They should stay with you overnight as well.    If you don't have someone to stay with you, and you aren't safe to go home alone, we may keep you overnight. Insurance often won't pay for this.  After surgery  If it's hard to control your pain or you need more pain medicine, please call your surgeon's office.  Questions?   If you have any questions for your care team, list them here: _________________________________________________________________________________________________________________________________________________________________________ ____________________________________ ____________________________________ ____________________________________  For informational purposes only. Not to replace the advice of your health care provider. Copyright   2003, 2019 [facility]. All rights reserved. Clinically reviewed by Leila Titus MD. [email or web address].[email or web address] [id number] - REV 07/21.

## 2022-02-08 NOTE — PROGRESS NOTES
[address] [address]  Phone: [phone]  Primary Provider: Kris Bernardo  Pre-op Performing Provider: SUELLEN WHITMAN      PREOPERATIVE EVALUATION:  Today's date: 2/8/2022    Dakota France is a 42 year old male who presents for a preoperative evaluation.    Surgical Information:  Surgery/Procedure: Ganglion Removal left 2nd toe  Surgery Location: [facility]  Surgeon: Dr. Mi  Surgery Date: 03/01/2022  Time of Surgery: TBD  Where patient plans to recover: At home with family  Fax number for surgical facility: [phone]    Type of Anesthesia Anticipated: Local    Assessment & Plan     The proposed surgical procedure is considered LOW risk.      1. Preoperative clearance  2. Mucous cyst of toe  3. Acquired mallet toe of left foot  Pt is new to me, for ganglion cyst removal preop clearance. Chronic left second toe pain , with mucous cyst and plans for removal as per [facility] records checked. Hx of PACs in the past. Not on any medications. Currently denies any cardiac symptoms. Will do baseline labs .  - Comprehensive metabolic panel (BMP + Alb, Alk Phos, ALT, AST, Total. Bili, TP); Future  - CBC with platelets; Future  - Comprehensive metabolic panel (BMP + Alb, Alk Phos, ALT, AST, Total. Bili, TP)  - CBC with platelets      Risks and Recommendations:  The patient has the following additional risks and recommendations for perioperative complications:  Cardiovascular:   - Elevated blood pressures which has been in the past and following lifestyle modifications, not on any medications.     Medication Instructions:  Patient is on no chronic medications    RECOMMENDATION:  APPROVAL GIVEN to proceed with proposed procedure, without further diagnostic evaluation.    Review of external notes as documented above     25 minutes spent on the date of the encounter doing chart review, review of outside records, review of test  results, interpretation of tests, patient visit and documentation       Subjective     HPI related to upcoming procedure:     Preop Questions 2/8/2022   1. Have you ever had a heart attack or stroke? No   2. Have you ever had surgery on your heart or blood vessels, such as a stent placement, a coronary artery bypass, or surgery on an artery in your head, neck, heart, or legs? No   3. Do you have chest pain with activity? No   4. Do you have a history of  heart failure? No   5. Do you currently have a cold, bronchitis or symptoms of other infection? No   6. Do you have a cough, shortness of breath, or wheezing? No   7. Do you or anyone in your family have previous history of blood clots? No   8. Do you or does anyone in your family have a serious bleeding problem such as prolonged bleeding following surgeries or cuts? No   9. Have you ever had problems with anemia or been told to take iron pills? No   10. Have you had any abnormal blood loss such as black, tarry or bloody stools? No   11. Have you ever had a blood transfusion? No   12. Are you willing to have a blood transfusion if it is medically needed before, during, or after your surgery? Yes   13. Have you or any of your relatives ever had problems with anesthesia? No   14. Do you have sleep apnea, excessive snoring or daytime drowsiness? No   15. Do you have any artifical heart valves or other implanted medical devices like a pacemaker, defibrillator, or continuous glucose monitor? No   16. Do you have artificial joints? No   17. Are you allergic to latex? No       Health Care Directive:  Patient does not have a Health Care Directive or Living Will:  N/A    Preoperative Review of :   reviewed - no record of controlled substances prescribed.    Status of Chronic Conditions:  See problem list for active medical problems.  Problems all longstanding and stable, except as noted/documented.  See ROS for pertinent symptoms related to these  conditions.      Review of Systems  CONSTITUTIONAL: NEGATIVE for fever, chills, change in weight  INTEGUMENTARY/SKIN: NEGATIVE for worrisome rashes, moles or lesions  EYES: NEGATIVE for vision changes or irritation  ENT/MOUTH: NEGATIVE for ear, mouth and throat problems  RESP: NEGATIVE for significant cough or SOB  CV: NEGATIVE for chest pain, palpitations or peripheral edema  GI: NEGATIVE for nausea, abdominal pain, heartburn, or change in bowel habits  : NEGATIVE for frequency, dysuria, or hematuria  MUSCULOSKELETAL: NEGATIVE for significant arthralgias or myalgia  NEURO: NEGATIVE for weakness, dizziness or paresthesias  ENDOCRINE: NEGATIVE for temperature intolerance, skin/hair changes  HEME: NEGATIVE for bleeding problems  PSYCHIATRIC: NEGATIVE for changes in mood or affect    Patient Active Problem List    Diagnosis Date Noted     Digital mucous cyst of toe of left foot 08/23/2021     Priority: Medium     Bilateral shoulder pain, unspecified chronicity 08/23/2021     Priority: Medium     Seborrheic keratosis 08/23/2021     Priority: Medium     Cholesterolosis gallbladder 06/29/2020     Priority: Medium     Elevated bilirubin 08/13/2019     Priority: Medium     Pigmented skin lesion 07/22/2019     Priority: Medium     Family history of abdominal aortic aneurysm (AAA) 04/11/2018     Priority: Medium     Family history of prostate cancer in father 04/11/2018     Priority: Medium     Skin tag 03/21/2017     Priority: Medium     H/O cardiac arrhythmia-PAC 04/27/2015     Priority: Medium     Overweight (BMI 25.0-29.9) 04/27/2015     Priority: Medium     CARDIOVASCULAR SCREENING; LDL GOAL LESS THAN 160 04/27/2015     Priority: Medium     Impingement syndrome of left shoulder 09/05/2013     Priority: Medium      Past Medical History:   Diagnosis Date     PAC (premature atrial contraction)      Past Surgical History:   Procedure Laterality Date     HERNIORRHAPHY INGUINAL BILATERAL       Current Outpatient Medications  "  Medication Sig Dispense Refill     ID NOW COVID-19 KIT USE 1 KIT TODAY AS DIRECTED         No Known Allergies     Social History     Tobacco Use     Smoking status: Former Smoker     Smokeless tobacco: Never Used   Substance Use Topics     Alcohol use: Yes     Family History   Problem Relation Age of Onset     Prostate Cancer Father      Abdominal Aortic Aneurysm Father 70     Prostate Problems Father      History   Drug Use No         Objective     /88   Pulse 67   Temp 97.3  F (36.3  C) (Tympanic)   Resp 18   Ht 1.791 m (5' 10.5\")   Wt 87.2 kg (192 lb 3.2 oz)   SpO2 98%   BMI 27.19 kg/m      Physical Exam    GENERAL APPEARANCE: healthy, alert and no distress     EYES: EOMI,  PERRL     HENT: ear canals and TM's normal and nose and mouth without ulcers or lesions     NECK: no adenopathy, no asymmetry, masses, or scars and thyroid normal to palpation     RESP: lungs clear to auscultation - no rales, rhonchi or wheezes     CV: regular rates and rhythm, normal S1 S2, no S3 or S4 and no murmur, click or rub     ABDOMEN:  soft, nontender, no HSM or masses and bowel sounds normal     MS: extremities normal- no gross deformities noted, no evidence of inflammation in joints, FROM in all extremities.     SKIN: no suspicious lesions or rashes . POSITIVE for mallet toe on the left second toe and mucous cyst     measuring 1.5 cm in diameter with dry and healed edges, no signs of infection at this time. No tenderness on palpation, no discharge.     NEURO: Normal strength and tone, sensory exam grossly normal, mentation intact and speech normal     PSYCH: mentation appears normal. and affect normal/bright     LYMPHATICS: No cervical adenopathy    No results for input(s): HGB, PLT, INR, NA, POTASSIUM, CR, A1C in the last 27728 hours.     Diagnostics:  Labs pending at this time.  Results will be reviewed when available.  Recent Results (from the past 720 hour(s))   CBC with platelets    Collection Time: 02/08/22  " 4:31 PM   Result Value Ref Range    WBC Count 5.4 4.0 - 11.0 10e3/uL    RBC Count 5.16 4.40 - 5.90 10e6/uL    Hemoglobin 15.7 13.3 - 17.7 g/dL    Hematocrit 46.5 40.0 - 53.0 %    MCV 90 78 - 100 fL    MCH 30.4 26.5 - 33.0 pg    MCHC 33.8 31.5 - 36.5 g/dL    RDW 12.1 10.0 - 15.0 %    Platelet Count 198 150 - 450 10e3/uL      No EKG required for low risk surgery (cataract, skin procedure, breast biopsy, etc).    Revised Cardiac Risk Index (RCRI):  The patient has the following serious cardiovascular risks for perioperative complications:   - No serious cardiac risks = 0 points     RCRI Interpretation: 0 points: Class I (very low risk - 0.4% complication rate)           Signed Electronically by: Leanne Montilla MD  Copy of this evaluation report is provided to requesting physician.

## 2022-02-09 LAB
ALBUMIN SERPL-MCNC: 4.3 G/DL (ref 3.4–5)
ALP SERPL-CCNC: 70 U/L (ref 40–150)
ALT SERPL W P-5'-P-CCNC: 27 U/L (ref 0–70)
ANION GAP SERPL CALCULATED.3IONS-SCNC: 5 MMOL/L (ref 3–14)
AST SERPL W P-5'-P-CCNC: 16 U/L (ref 0–45)
BILIRUB SERPL-MCNC: 1.3 MG/DL (ref 0.2–1.3)
BUN SERPL-MCNC: 14 MG/DL (ref 7–30)
CALCIUM SERPL-MCNC: 9.5 MG/DL (ref 8.5–10.1)
CHLORIDE BLD-SCNC: 105 MMOL/L (ref 94–109)
CO2 SERPL-SCNC: 28 MMOL/L (ref 20–32)
CREAT SERPL-MCNC: 0.96 MG/DL (ref 0.66–1.25)
GFR SERPL CREATININE-BSD FRML MDRD: >90 ML/MIN/1.73M2
GLUCOSE BLD-MCNC: 90 MG/DL (ref 70–99)
POTASSIUM BLD-SCNC: 4.2 MMOL/L (ref 3.4–5.3)
PROT SERPL-MCNC: 8 G/DL (ref 6.8–8.8)
SODIUM SERPL-SCNC: 138 MMOL/L (ref 133–144)

## 2022-02-10 ENCOUNTER — TELEPHONE (OUTPATIENT)
Dept: FAMILY MEDICINE | Facility: CLINIC | Age: 42
End: 2022-02-10
Payer: COMMERCIAL

## 2022-02-10 NOTE — TELEPHONE ENCOUNTER
VM left for pt to check my chart for normal results and note from Dr. Montilla.    Carmina MCKEON RN  EP Triage

## 2022-02-10 NOTE — TELEPHONE ENCOUNTER
----- Message from Leanne Montilla MD sent at 2/9/2022 10:55 PM CST -----  All your labs are normal, there might be some highlighted which doesn't have any clinical significance.    Leanne Montilla MD  Internal medicine physician  [facility]

## 2022-02-22 ENCOUNTER — MYC MEDICAL ADVICE (OUTPATIENT)
Dept: FAMILY MEDICINE | Facility: CLINIC | Age: 42
End: 2022-02-22
Payer: COMMERCIAL

## 2022-02-23 NOTE — TELEPHONE ENCOUNTER
Please fax the preop clearance of the patient we saw on 2/8/22 to [phone]. That's the fax number for the Pending sale to [facility].Please confrim and update the patient as per his Mychart reply given today.     Thank you,  Leanne Montilla MD on 2/23/2022 at 3:32 PM

## 2022-03-14 ENCOUNTER — TRANSFERRED RECORDS (OUTPATIENT)
Dept: HEALTH INFORMATION MANAGEMENT | Facility: CLINIC | Age: 42
End: 2022-03-14
Payer: COMMERCIAL

## 2022-04-11 ENCOUNTER — TRANSFERRED RECORDS (OUTPATIENT)
Dept: HEALTH INFORMATION MANAGEMENT | Facility: CLINIC | Age: 42
End: 2022-04-11
Payer: COMMERCIAL

## 2022-10-22 ENCOUNTER — HEALTH MAINTENANCE LETTER (OUTPATIENT)
Age: 42
End: 2022-10-22

## 2022-10-26 ENCOUNTER — MYC MEDICAL ADVICE (OUTPATIENT)
Dept: CARDIOLOGY | Facility: CLINIC | Age: 42
End: 2022-10-26

## 2022-11-01 ENCOUNTER — HOSPITAL ENCOUNTER (OUTPATIENT)
Dept: CARDIOLOGY | Facility: CLINIC | Age: 42
Discharge: HOME | End: 2022-11-01
Attending: INTERNAL MEDICINE
Payer: COMMERCIAL

## 2022-11-01 ENCOUNTER — OFFICE VISIT (OUTPATIENT)
Dept: CARDIOLOGY | Facility: CLINIC | Age: 42
End: 2022-11-01
Payer: COMMERCIAL

## 2022-11-01 ENCOUNTER — LAB (OUTPATIENT)
Dept: LAB | Facility: CLINIC | Age: 42
End: 2022-11-01
Payer: COMMERCIAL

## 2022-11-01 VITALS
BODY MASS INDEX: 25.2 KG/M2 | SYSTOLIC BLOOD PRESSURE: 114 MMHG | DIASTOLIC BLOOD PRESSURE: 76 MMHG | HEART RATE: 74 BPM | HEIGHT: 71 IN | WEIGHT: 180 LBS

## 2022-11-01 DIAGNOSIS — I48.0 PAROXYSMAL ATRIAL FIBRILLATION (H): ICD-10-CM

## 2022-11-01 DIAGNOSIS — R00.2 PALPITATIONS: Primary | ICD-10-CM

## 2022-11-01 DIAGNOSIS — R00.2 PALPITATIONS: ICD-10-CM

## 2022-11-01 LAB
CHOLEST SERPL-MCNC: 206 MG/DL
HDLC SERPL-MCNC: 49 MG/DL
LDLC SERPL CALC-MCNC: 135 MG/DL
NONHDLC SERPL-MCNC: 157 MG/DL
TRIGL SERPL-MCNC: 108 MG/DL
TSH SERPL DL<=0.005 MIU/L-ACNC: 2.01 UIU/ML (ref 0.3–4.2)

## 2022-11-01 PROCEDURE — 93000 ELECTROCARDIOGRAM COMPLETE: CPT | Performed by: INTERNAL MEDICINE

## 2022-11-01 PROCEDURE — 93225 XTRNL ECG REC<48 HRS REC: CPT

## 2022-11-01 PROCEDURE — 84443 ASSAY THYROID STIM HORMONE: CPT

## 2022-11-01 PROCEDURE — 36415 COLL VENOUS BLD VENIPUNCTURE: CPT

## 2022-11-01 PROCEDURE — 99205 OFFICE O/P NEW HI 60 MIN: CPT | Performed by: INTERNAL MEDICINE

## 2022-11-01 PROCEDURE — 80061 LIPID PANEL: CPT

## 2022-11-01 PROCEDURE — 93227 XTRNL ECG REC<48 HR R&I: CPT | Performed by: INTERNAL MEDICINE

## 2022-11-01 RX ORDER — MULTIVITAMIN WITH IRON
1 TABLET ORAL DAILY
COMMUNITY
End: 2024-07-03

## 2022-11-01 RX ORDER — METOPROLOL SUCCINATE 25 MG/1
25 TABLET, EXTENDED RELEASE ORAL DAILY
COMMUNITY
End: 2022-11-16

## 2022-11-01 NOTE — PROGRESS NOTES
HISTORY:    Dakota France is a very pleasant and thoughtful 42-year-old male seen today in cardiology clinic along with his wife.  He had a recent episode of atrial fibrillation and is see me for that reason.    Dakota reports that at age 19 he started experiencing some extra beats, he believes this was as frequently as every other beat and lasted for about 3 weeks.  He then presented to the emergency room where he recalls being given some type of IV and then sent home on supplemental magnesium.    3 weeks ago he began experiencing more extra beats.  He attributed this to a lot of stress in his life.  This began to get better but then 2 weeks ago he was eating some ice cream at the table and his heart went into an irregular rhythm.  He presented to [facility] where he was found to be in atrial fibrillation with a rapid ventricular rate of about 150.  He was successfully cardioverted and sent home.  He was started on metoprolol and Eliquis at that time.    Dakota he reports that he had not been drinking alcohol or using caffeine at the time.  Other than having a lot of stress in his life just now there was nothing else unusual.  He does report that he tried eating ice cream once again and felt that his rhythm became irregular again but he tried it a third time and it did not have any effect.    Dakota has been using his metoprolol and has noticed that when he exercises he has some facial flushing.  Otherwise his medications are well-tolerated.  He denies exertional chest pain and does a lot of jogging and other vigorous exercises without difficulty.  There is no peripheral edema or claudication no history of syncope, near syncope, orthostasis, or PND/orthopnea.    The patient has noted intermittently that he has a fair amount of palpitations.  While he was sitting there he said he had at least 15 or 20 irregular beats that he was aware of.  These are quite bothersome for him and disrupt him.  He is very  aware of them when they occur.      ASSESSMENT/PLAN:    1.  Atrial fibrillation.  Cause unclear, likely just a random event but may be related to his palpitations which are likely to represent frequent PACs.  It is also possible that the ice cream he was eating was the trigger for set of atrial fibrillation and we discussed this.  He has a [id number] score of 0 and should not be on long-term anticoagulation but I told him to complete his current dose of Eliquis because he did undergo a cardioversion at [facility] recently.  In the long run I suggested that he is probably best off not taking aspirin at least for now.  He should have a TSH checked although he does not give any signs or symptoms of hypo or hyperthyroidism.  I will also have a lipid profile drawn since he has not had one done for several years.  Reports that he lost 30 pounds since his last lipid profile was drawn.  2.  Palpitations.  I suspect this represents PACs.  Frequent PACs are known to be associated with an increased incidence of atrial fibrillation.  As long as his echocardiogram is normal our only treatment will be long-term beta-blockade if he is interested in continuing this.  He seems to be tolerating it well except for the flushed face and that will be a decision that he will have to make as to whether it is worth taking or not.  He seems bothered enough by his palpitations that I suspect he will continue medical therapy.  A 48-hour Holter monitor will be done to evaluate his palpitations.    Thank you for inviting me to participate in the care of your patient.  Please don't hesitate to call if I can be of further assistance.  60 minutes were spent today reviewing the chart and other records, interviewing and examining the patient, and documenting our visit.    Chart documentation was completed, in part, with order[employer] voice-recognition software. Even though reviewed, some grammatical, spelling, and word errors may remain.        Orders Placed This Encounter   Procedures     TSH with free T4 reflex     Lipid Profile     EKG 12-lead complete w/read - Clinics (performed today)     Holter Monitor 24 hour Adult Pediatric     Echocardiogram Complete     Orders Placed This Encounter   Medications     metoprolol succinate ER (TOPROL XL) 25 MG 24 hr tablet     Sig: Take 25 mg by mouth daily     apixaban ANTICOAGULANT (ELIQUIS) 5 MG tablet     Sig: Take 5 mg by mouth 2 times daily     magnesium 250 MG tablet     Sig: Take 1 tablet by mouth daily 400 mg daily     There are no discontinued medications.    10 year ASCVD risk: The 10-year ASCVD risk score (Otto MERCER, et al., 2019) is: 2.2%    Values used to calculate the score:      Age: 42 years      Sex: Male      Is Non- : No      Diabetic: No      Tobacco smoker: No      Systolic Blood Pressure: 114 mmHg      Is BP treated: No      HDL Cholesterol: 42 mg/dL      Total Cholesterol: 221 mg/dL    Encounter Diagnoses   Name Primary?     Palpitations Yes     Paroxysmal atrial fibrillation (H)        CURRENT MEDICATIONS:  Current Outpatient Medications   Medication Sig Dispense Refill     apixaban ANTICOAGULANT (ELIQUIS) 5 MG tablet Take 5 mg by mouth 2 times daily       magnesium 250 MG tablet Take 1 tablet by mouth daily 400 mg daily       metoprolol succinate ER (TOPROL XL) 25 MG 24 hr tablet Take 25 mg by mouth daily       ID NOW COVID-19 KIT USE 1 KIT TODAY AS DIRECTED (Patient not taking: Reported on 11/1/2022)         ALLERGIES   No Known Allergies    PAST MEDICAL HISTORY:  Past Medical History:   Diagnosis Date     PAC (premature atrial contraction)        PAST SURGICAL HISTORY:  Past Surgical History:   Procedure Laterality Date     HERNIORRHAPHY INGUINAL BILATERAL         FAMILY HISTORY:  Family History   Problem Relation Age of Onset     Prostate Cancer Father      Abdominal Aortic Aneurysm Father 70     Prostate Problems Father        SOCIAL HISTORY:  Social History  "    Socioeconomic History     Marital status:      Spouse name: None     Number of children: None     Years of education: None     Highest education level: None   Tobacco Use     Smoking status: Former     Smokeless tobacco: Never   Substance and Sexual Activity     Alcohol use: Not Currently     Drug use: No     Sexual activity: Yes     Partners: Female       Review of Systems:  Skin:        Eyes:       ENT:       Respiratory:  Negative    Cardiovascular:    palpitations;Positive for  Gastroenterology:      Genitourinary:       Musculoskeletal:       Neurologic:       Psychiatric:       Heme/Lymph/Imm:       Endocrine:         Physical Exam:  Vitals: /76   Pulse 74   Ht 1.791 m (5' 10.5\")   Wt 81.6 kg (180 lb)   BMI 25.46 kg/m      Constitutional:  cooperative, alert and oriented, well developed, well nourished, in no acute distress   Fit in appearance    Skin:  warm and dry to the touch, no apparent skin lesions or masses noted        Head:  normocephalic, no masses or lesions        Eyes:  pupils equal and round, conjunctivae and lids unremarkable, sclera white, no xanthalasma, EOMS intact, no nystagmus        ENT:           Neck:  carotid pulses are full and equal bilaterally, JVP normal, no carotid bruit        Chest:  normal breath sounds, clear to auscultation, normal A-P diameter, normal symmetry, normal respiratory excursion, no use of accessory muscles        Cardiac: regular rhythm, normal S1/S2, no S3 or S4, apical impulse not displaced, no murmurs, gallops or rubs                  Abdomen:  abdomen soft;BS normoactive        Vascular: pulses full and equal                                      Extremities and Muscular Skeletal:  no edema           Neurological:  no gross motor deficits        Psych:  affect appropriate, oriented to time, person and place     Recent Lab Results:  LIPID RESULTS:  Lab Results   Component Value Date    CHOL 221 (H) 07/22/2019    HDL 42 07/22/2019     " (H) 07/22/2019    TRIG 171 (H) 07/22/2019    CHOLHDLRATIO 4.9 05/11/2015       LIVER ENZYME RESULTS:  Lab Results   Component Value Date    AST 16 02/08/2022    AST 16 10/11/2019    ALT 27 02/08/2022    ALT 26 10/11/2019       CBC RESULTS:  Lab Results   Component Value Date    WBC 5.4 02/08/2022    WBC 5.3 07/22/2019    RBC 5.16 02/08/2022    RBC 5.21 07/22/2019    HGB 15.7 02/08/2022    HGB 15.5 07/22/2019    HCT 46.5 02/08/2022    HCT 45.5 07/22/2019    MCV 90 02/08/2022    MCV 87 07/22/2019    MCH 30.4 02/08/2022    MCH 29.8 07/22/2019    MCHC 33.8 02/08/2022    MCHC 34.1 07/22/2019    RDW 12.1 02/08/2022    RDW 11.5 07/22/2019     02/08/2022     07/22/2019       BMP RESULTS:  Lab Results   Component Value Date     02/08/2022     07/22/2019    POTASSIUM 4.2 02/08/2022    POTASSIUM 4.1 07/22/2019    CHLORIDE 105 02/08/2022    CHLORIDE 106 07/22/2019    CO2 28 02/08/2022    CO2 30 07/22/2019    ANIONGAP 5 02/08/2022    ANIONGAP 4 07/22/2019    GLC 90 02/08/2022    GLC 98 07/22/2019    BUN 14 02/08/2022    BUN 18 07/22/2019    CR 0.96 02/08/2022    CR 0.97 07/22/2019    GFRESTIMATED >90 02/08/2022    GFRESTIMATED >90 07/22/2019    GFRESTBLACK >90 07/22/2019    FERNANDO 9.5 02/08/2022    FERNANDO 9.4 07/22/2019        A1C RESULTS:  Lab Results   Component Value Date    A1C 5.3 07/22/2019       INR RESULTS:  No results found for: INR      Chad Moreira MD, [facility]    CC  Referred Self, MD  No address on file

## 2022-11-01 NOTE — LETTER
11/1/2022    Kris Bernardo MD  [address]  [facility] [address]    RE: Dakota France       Dear Colleague,     I had the pleasure of seeing Dakota France in the [facility].  HISTORY:    Dakota France is a very pleasant and thoughtful 42-year-old male seen today in cardiology clinic along with his wife.  He had a recent episode of atrial fibrillation and is see me for that reason.    Dakota reports that at age 19 he started experiencing some extra beats, he believes this was as frequently as every other beat and lasted for about 3 weeks.  He then presented to the emergency room where he recalls being given some type of IV and then sent home on supplemental magnesium.    3 weeks ago he began experiencing more extra beats.  He attributed this to a lot of stress in his life.  This began to get better but then 2 weeks ago he was eating some ice cream at the table and his heart went into an irregular rhythm.  He presented to [facility] where he was found to be in atrial fibrillation with a rapid ventricular rate of about 150.  He was successfully cardioverted and sent home.  He was started on metoprolol and Eliquis at that time.    Dakota he reports that he had not been drinking alcohol or using caffeine at the time.  Other than having a lot of stress in his life just now there was nothing else unusual.  He does report that he tried eating ice cream once again and felt that his rhythm became irregular again but he tried it a third time and it did not have any effect.    Dakota has been using his metoprolol and has noticed that when he exercises he has some facial flushing.  Otherwise his medications are well-tolerated.  He denies exertional chest pain and does a lot of jogging and other vigorous exercises without difficulty.  There is no peripheral edema or claudication no history of syncope, near syncope, orthostasis, or PND/orthopnea.    The patient has noted  intermittently that he has a fair amount of palpitations.  While he was sitting there he said he had at least 15 or 20 irregular beats that he was aware of.  These are quite bothersome for him and disrupt him.  He is very aware of them when they occur.      ASSESSMENT/PLAN:    1.  Atrial fibrillation.  Cause unclear, likely just a random event but may be related to his palpitations which are likely to represent frequent PACs.  It is also possible that the ice cream he was eating was the trigger for set of atrial fibrillation and we discussed this.  He has a [id number] score of 0 and should not be on long-term anticoagulation but I told him to complete his current dose of Eliquis because he did undergo a cardioversion at [facility] recently.  In the long run I suggested that he is probably best off not taking aspirin at least for now.  He should have a TSH checked although he does not give any signs or symptoms of hypo or hyperthyroidism.  I will also have a lipid profile drawn since he has not had one done for several years.  Reports that he lost 30 pounds since his last lipid profile was drawn.  2.  Palpitations.  I suspect this represents PACs.  Frequent PACs are known to be associated with an increased incidence of atrial fibrillation.  As long as his echocardiogram is normal our only treatment will be long-term beta-blockade if he is interested in continuing this.  He seems to be tolerating it well except for the flushed face and that will be a decision that he will have to make as to whether it is worth taking or not.  He seems bothered enough by his palpitations that I suspect he will continue medical therapy.  A 48-hour Holter monitor will be done to evaluate his palpitations.    Thank you for inviting me to participate in the care of your patient.  Please don't hesitate to call if I can be of further assistance.  60 minutes were spent today reviewing the chart and other records, interviewing  and examining the patient, and documenting our visit.    Chart documentation was completed, in part, with [employer] voice-recognition software. Even though reviewed, some grammatical, spelling, and word errors may remain.       Orders Placed This Encounter   Procedures     TSH with free T4 reflex     Lipid Profile     EKG 12-lead complete w/read - Clinics (performed today)     Holter Monitor 24 hour Adult Pediatric     Echocardiogram Complete     Orders Placed This Encounter   Medications     metoprolol succinate ER (TOPROL XL) 25 MG 24 hr tablet     Sig: Take 25 mg by mouth daily     apixaban ANTICOAGULANT (ELIQUIS) 5 MG tablet     Sig: Take 5 mg by mouth 2 times daily     magnesium 250 MG tablet     Sig: Take 1 tablet by mouth daily 400 mg daily     There are no discontinued medications.    10 year ASCVD risk: The 10-year ASCVD risk score (Otto MERCER, et al., 2019) is: 2.2%    Values used to calculate the score:      Age: 42 years      Sex: Male      Is Non- : No      Diabetic: No      Tobacco smoker: No      Systolic Blood Pressure: 114 mmHg      Is BP treated: No      HDL Cholesterol: 42 mg/dL      Total Cholesterol: 221 mg/dL    Encounter Diagnoses   Name Primary?     Palpitations Yes     Paroxysmal atrial fibrillation (H)        CURRENT MEDICATIONS:  Current Outpatient Medications   Medication Sig Dispense Refill     apixaban ANTICOAGULANT (ELIQUIS) 5 MG tablet Take 5 mg by mouth 2 times daily       magnesium 250 MG tablet Take 1 tablet by mouth daily 400 mg daily       metoprolol succinate ER (TOPROL XL) 25 MG 24 hr tablet Take 25 mg by mouth daily       ID NOW COVID-19 KIT USE 1 KIT TODAY AS DIRECTED (Patient not taking: Reported on 11/1/2022)         ALLERGIES   No Known Allergies    PAST MEDICAL HISTORY:  Past Medical History:   Diagnosis Date     PAC (premature atrial contraction)        PAST SURGICAL HISTORY:  Past Surgical History:   Procedure Laterality Date     HERNIORRHAPHY  "INGUINAL BILATERAL         FAMILY HISTORY:  Family History   Problem Relation Age of Onset     Prostate Cancer Father      Abdominal Aortic Aneurysm Father 70     Prostate Problems Father        SOCIAL HISTORY:  Social History     Socioeconomic History     Marital status:      Spouse name: None     Number of children: None     Years of education: None     Highest education level: None   Tobacco Use     Smoking status: Former     Smokeless tobacco: Never   Substance and Sexual Activity     Alcohol use: Not Currently     Drug use: No     Sexual activity: Yes     Partners: Female       Review of Systems:  Skin:        Eyes:       ENT:       Respiratory:  Negative    Cardiovascular:    palpitations;Positive for  Gastroenterology:      Genitourinary:       Musculoskeletal:       Neurologic:       Psychiatric:       Heme/Lymph/Imm:       Endocrine:         Physical Exam:  Vitals: /76   Pulse 74   Ht 1.791 m (5' 10.5\")   Wt 81.6 kg (180 lb)   BMI 25.46 kg/m      Constitutional:  cooperative, alert and oriented, well developed, well nourished, in no acute distress   Fit in appearance    Skin:  warm and dry to the touch, no apparent skin lesions or masses noted        Head:  normocephalic, no masses or lesions        Eyes:  pupils equal and round, conjunctivae and lids unremarkable, sclera white, no xanthalasma, EOMS intact, no nystagmus        ENT:           Neck:  carotid pulses are full and equal bilaterally, JVP normal, no carotid bruit        Chest:  normal breath sounds, clear to auscultation, normal A-P diameter, normal symmetry, normal respiratory excursion, no use of accessory muscles        Cardiac: regular rhythm, normal S1/S2, no S3 or S4, apical impulse not displaced, no murmurs, gallops or rubs                  Abdomen:  abdomen soft;BS normoactive        Vascular: pulses full and equal                                      Extremities and Muscular Skeletal:  no edema           Neurological:  no " gross motor deficits        Psych:  affect appropriate, oriented to time, person and place     Recent Lab Results:  LIPID RESULTS:  Lab Results   Component Value Date    CHOL 221 (H) 07/22/2019    HDL 42 07/22/2019     (H) 07/22/2019    TRIG 171 (H) 07/22/2019    CHOLHDLRATIO 4.9 05/11/2015       LIVER ENZYME RESULTS:  Lab Results   Component Value Date    AST 16 02/08/2022    AST 16 10/11/2019    ALT 27 02/08/2022    ALT 26 10/11/2019       CBC RESULTS:  Lab Results   Component Value Date    WBC 5.4 02/08/2022    WBC 5.3 07/22/2019    RBC 5.16 02/08/2022    RBC 5.21 07/22/2019    HGB 15.7 02/08/2022    HGB 15.5 07/22/2019    HCT 46.5 02/08/2022    HCT 45.5 07/22/2019    MCV 90 02/08/2022    MCV 87 07/22/2019    MCH 30.4 02/08/2022    MCH 29.8 07/22/2019    MCHC 33.8 02/08/2022    MCHC 34.1 07/22/2019    RDW 12.1 02/08/2022    RDW 11.5 07/22/2019     02/08/2022     07/22/2019       BMP RESULTS:  Lab Results   Component Value Date     02/08/2022     07/22/2019    POTASSIUM 4.2 02/08/2022    POTASSIUM 4.1 07/22/2019    CHLORIDE 105 02/08/2022    CHLORIDE 106 07/22/2019    CO2 28 02/08/2022    CO2 30 07/22/2019    ANIONGAP 5 02/08/2022    ANIONGAP 4 07/22/2019    GLC 90 02/08/2022    GLC 98 07/22/2019    BUN 14 02/08/2022    BUN 18 07/22/2019    CR 0.96 02/08/2022    CR 0.97 07/22/2019    GFRESTIMATED >90 02/08/2022    GFRESTIMATED >90 07/22/2019    GFRESTBLACK >90 07/22/2019    FERNANDO 9.5 02/08/2022    FERNANDO 9.4 07/22/2019        A1C RESULTS:  Lab Results   Component Value Date    A1C 5.3 07/22/2019       INR RESULTS:  No results found for: INR      Chad Moreira MD, FACC    CC  Referred Self,    Thank you for allowing me to participate in the care of your patient.      Sincerely,     Chad Moreira MD     [facility]

## 2022-11-03 ENCOUNTER — MYC MEDICAL ADVICE (OUTPATIENT)
Dept: CARDIOLOGY | Facility: CLINIC | Age: 42
End: 2022-11-03

## 2022-11-03 NOTE — RESULT ENCOUNTER NOTE
Results and recommendations routed Via My Chart with call back information if needed.     Pt with afib, no known or suspected vascular disease, lipids drawn for screening purposes.  In this setting his lipids don't require therapy, but are a bit on the high side so dietary discretion is advisable.  Recommend Mediterranean Diet as well as regular exercise

## 2022-11-10 ENCOUNTER — TELEPHONE (OUTPATIENT)
Dept: CARDIOLOGY | Facility: CLINIC | Age: 42
End: 2022-11-10

## 2022-11-10 ENCOUNTER — HOSPITAL ENCOUNTER (OUTPATIENT)
Dept: CARDIOLOGY | Facility: CLINIC | Age: 42
Discharge: HOME | End: 2022-11-10
Attending: INTERNAL MEDICINE | Admitting: INTERNAL MEDICINE
Payer: COMMERCIAL

## 2022-11-10 DIAGNOSIS — I48.0 PAROXYSMAL ATRIAL FIBRILLATION (H): ICD-10-CM

## 2022-11-10 DIAGNOSIS — R00.2 PALPITATIONS: ICD-10-CM

## 2022-11-10 LAB — LVEF ECHO: NORMAL

## 2022-11-10 PROCEDURE — 93306 TTE W/DOPPLER COMPLETE: CPT

## 2022-11-10 PROCEDURE — 93306 TTE W/DOPPLER COMPLETE: CPT | Mod: 26 | Performed by: INTERNAL MEDICINE

## 2022-11-10 NOTE — TELEPHONE ENCOUNTER
OV 11-1-22    Palpitations.  I suspect this represents PACs.  Frequent PACs are known to be associated with an increased incidence of atrial fibrillation.  As long as his echocardiogram is normal our only treatment will be long-term beta-blockade if he is interested in continuing this.  He seems to be tolerating it well except for the flushed face and that will be a decision that he will have to make as to whether it is worth taking or not.  He seems bothered enough by his palpitations that I suspect he will continue medical therapy.  A 48-hour Holter monitor will be done to evaluate his palpitations.    Echo results:  Interpretation Summary  The visual ejection fraction is 55-60%.  The right ventricle is normal in size and function.  There is no pericardial effusion.  ______________________________________________________________________________  Left Ventricle  The left ventricle is normal in size. There is normal left ventricular wall  thickness. The visual ejection fraction is 55-60%. Diastolic Doppler findings  (E/E' ratio and/or other parameters) suggest left ventricular filling  pressures are normal. No regional wall motion abnormalities noted.     48 hour holter results

## 2022-11-11 NOTE — TELEPHONE ENCOUNTER
Patient notified or results and recommendations.  He would like to keep his appt with Dr. Moreira in December but will call and cancel if he doesn't have any sx of AF or his palpitations are better.          His echo and TSH were normal.   His Holter showed a high PAC burden, but no specific cause is clear (as is often the case), and therefore no specific therapy.     He should avoid ice cream since this is a suspected trigger, or he can try it again and see if it triggers his afib again and give it up only if it happens again (since it might have been coincidence).  He can continue b-blocker to suppress palpitations or stop it and live with them if he would rather.  Medically it does not make a difference.   I really don't need to see him again unless he has more problems with afib (prn).

## 2022-11-13 ENCOUNTER — MYC MEDICAL ADVICE (OUTPATIENT)
Dept: CARDIOLOGY | Facility: CLINIC | Age: 42
End: 2022-11-13

## 2022-11-16 DIAGNOSIS — R00.2 PALPITATIONS: Primary | ICD-10-CM

## 2022-11-16 RX ORDER — METOPROLOL SUCCINATE 25 MG/1
25 TABLET, EXTENDED RELEASE ORAL DAILY
Status: CANCELLED | OUTPATIENT
Start: 2022-11-16

## 2022-11-16 RX ORDER — METOPROLOL SUCCINATE 25 MG/1
25 TABLET, EXTENDED RELEASE ORAL DAILY
Qty: 90 TABLET | Refills: 3 | Status: SHIPPED | OUTPATIENT
Start: 2022-11-16 | End: 2023-01-13

## 2022-12-13 ENCOUNTER — OFFICE VISIT (OUTPATIENT)
Dept: CARDIOLOGY | Facility: CLINIC | Age: 42
End: 2022-12-13
Payer: COMMERCIAL

## 2022-12-13 VITALS
OXYGEN SATURATION: 100 % | SYSTOLIC BLOOD PRESSURE: 130 MMHG | DIASTOLIC BLOOD PRESSURE: 82 MMHG | WEIGHT: 185.3 LBS | HEIGHT: 71 IN | BODY MASS INDEX: 25.94 KG/M2 | HEART RATE: 71 BPM

## 2022-12-13 DIAGNOSIS — I48.0 PAROXYSMAL ATRIAL FIBRILLATION (H): Primary | ICD-10-CM

## 2022-12-13 PROCEDURE — 99214 OFFICE O/P EST MOD 30 MIN: CPT | Performed by: INTERNAL MEDICINE

## 2022-12-13 RX ORDER — OMEGA-3/DHA/EPA/FISH OIL 60 MG-90MG
CAPSULE ORAL
COMMUNITY
End: 2024-07-03

## 2022-12-13 NOTE — LETTER
12/13/2022    Kris Bernardo MD  [address]  [place] [facility] [address]    RE: Dakota France       Dear Colleague,     I had the pleasure of seeing Dakota France in the [facility].  HISTORY:    Dakota France is a extremely pleasant 42-year-old gentleman who was seen in cardiology consultation about 6 weeks ago after developing a single episode of atrial fibrillation after eating ice cream.  He is generally healthy and fit and has a [id number] score of 0, so long-term anticoagulation was not recommended.  I had left it up to him as to whether he saw me again, and he is here today to discuss the outcomes of test that were done and discussed atrial ablation in general.    An echocardiogram was ordered and was discussed in detail today.  I actually pulled up images to review with him.  It was essentially normal except there was some mitral annular calcification and some aortic sclerosis noted.  There is slight thickening of the mitral annulus and the aortic valves are borderline thickened but neither are really substantially outside of normal.  I explained this to him.    Dakota reports that he is having occasional palpitations still and he has begun to recognize that they tend to be more if he gets inadequate sleep.  He is trying to get more sleep and has switched to a Mediterranean diet because of some borderline elevation of his cholesterol.  Overall he thinks his PACs are getting better.    I reviewed the results of his 48-hour Holter monitor which demonstrated 10% PACs and occasional PVCs but no atrial fibrillation or other significant arrhythmias.      ASSESSMENT/PLAN:    1.  Paroxysmal atrial fibrillation.  The patient has had only 1 documented episode.  Interestingly, he does report that his first episode occurred after eating ice cream and he tested to see if ice cream would cause it again and indeed it did but it lasted only a minute or so.  He has now been avoiding ice cream.   We talked about PACs and talked about the possibility of eventually using either a pill in the pocket, regular antiarrhythmic, or ablation as future therapies should his atrial fibrillation become more frequent.  I told him that he does not need to rush into an emergency room if he develops more atrial for but should probably go in within 24 to 36 hours so that it will still be safe to cardiovert him if it persists.  Again we will leave future visits open ended.  I would be happy to see him if he has other problems but as long as he is having no further episodes he does not need to follow-up with me.    Thank you for inviting me to participate in the care of your patient.  Please don't hesitate to call if I can be of further assistance.  35 minutes were spent today reviewing the chart and other records, interviewing and examining the patient, and documenting our visit.    Chart documentation was completed, in part, with [employer] voice-recognition software. Even though reviewed, some grammatical, spelling, and word errors may remain.       No orders of the defined types were placed in this encounter.    Orders Placed This Encounter   Medications     fish oil-omega-3 fatty acids 500 MG capsule     There are no discontinued medications.    10 year ASCVD risk: The 10-year ASCVD risk score (Otto DK, et al., 2019) is: 2%    Values used to calculate the score:      Age: 42 years      Sex: Male      Is Non- : No      Diabetic: No      Tobacco smoker: No      Systolic Blood Pressure: 130 mmHg      Is BP treated: No      HDL Cholesterol: 49 mg/dL      Total Cholesterol: 206 mg/dL    No diagnosis found.    CURRENT MEDICATIONS:  Current Outpatient Medications   Medication Sig Dispense Refill     fish oil-omega-3 fatty acids 500 MG capsule        magnesium 250 MG tablet Take 1 tablet by mouth daily 400 mg daily       apixaban ANTICOAGULANT (ELIQUIS) 5 MG tablet Take 5 mg by mouth 2 times daily (Patient  "not taking: Reported on 12/13/2022)       ID NOW COVID-19 KIT USE 1 KIT TODAY AS DIRECTED (Patient not taking: Reported on 11/1/2022)       metoprolol succinate ER (TOPROL XL) 25 MG 24 hr tablet Take 1 tablet (25 mg) by mouth daily (Patient not taking: Reported on 12/13/2022) 90 tablet 3       ALLERGIES   No Known Allergies    PAST MEDICAL HISTORY:  Past Medical History:   Diagnosis Date     PAC (premature atrial contraction)        PAST SURGICAL HISTORY:  Past Surgical History:   Procedure Laterality Date     HERNIORRHAPHY INGUINAL BILATERAL         FAMILY HISTORY:  Family History   Problem Relation Age of Onset     Prostate Cancer Father      Abdominal Aortic Aneurysm Father 70     Prostate Problems Father        SOCIAL HISTORY:  Social History     Socioeconomic History     Marital status:      Spouse name: None     Number of children: None     Years of education: None     Highest education level: None   Tobacco Use     Smoking status: Former     Smokeless tobacco: Never   Substance and Sexual Activity     Alcohol use: Not Currently     Drug use: No     Sexual activity: Yes     Partners: Female       Review of Systems:  Skin:  Negative     Eyes:  Positive for contacts;glasses  ENT:  Negative    Respiratory:  Negative    Cardiovascular:    Positive for;palpitations  Gastroenterology: not assessed    Genitourinary:  not assessed    Musculoskeletal:  Negative    Neurologic:  Negative    Psychiatric:  Negative    Heme/Lymph/Imm:  Negative    Endocrine:  Negative      Physical Exam:  Vitals: /82 (BP Location: Right arm, Patient Position: Sitting, Cuff Size: Adult Regular)   Pulse 71   Ht 1.791 m (5' 10.5\")   Wt 84.1 kg (185 lb 4.8 oz)   SpO2 100%   BMI 26.21 kg/m      Constitutional:           Skin:           Head:           Eyes:           ENT:           Neck:           Chest:           Cardiac:                    Abdomen:           Vascular:                                        Extremities and " Muscular Skeletal:              Neurological:           Psych:        Recent Lab Results:  LIPID RESULTS:  Lab Results   Component Value Date    CHOL 206 (H) 11/01/2022    CHOL 221 (H) 07/22/2019    HDL 49 11/01/2022    HDL 42 07/22/2019     (H) 11/01/2022     (H) 07/22/2019    TRIG 108 11/01/2022    TRIG 171 (H) 07/22/2019    CHOLHDLRATIO 4.9 05/11/2015       LIVER ENZYME RESULTS:  Lab Results   Component Value Date    AST 16 02/08/2022    AST 16 10/11/2019    ALT 27 02/08/2022    ALT 26 10/11/2019       CBC RESULTS:  Lab Results   Component Value Date    WBC 5.4 02/08/2022    WBC 5.3 07/22/2019    RBC 5.16 02/08/2022    RBC 5.21 07/22/2019    HGB 15.7 02/08/2022    HGB 15.5 07/22/2019    HCT 46.5 02/08/2022    HCT 45.5 07/22/2019    MCV 90 02/08/2022    MCV 87 07/22/2019    MCH 30.4 02/08/2022    MCH 29.8 07/22/2019    MCHC 33.8 02/08/2022    MCHC 34.1 07/22/2019    RDW 12.1 02/08/2022    RDW 11.5 07/22/2019     02/08/2022     07/22/2019       BMP RESULTS:  Lab Results   Component Value Date     02/08/2022     07/22/2019    POTASSIUM 4.2 02/08/2022    POTASSIUM 4.1 07/22/2019    CHLORIDE 105 02/08/2022    CHLORIDE 106 07/22/2019    CO2 28 02/08/2022    CO2 30 07/22/2019    ANIONGAP 5 02/08/2022    ANIONGAP 4 07/22/2019    GLC 90 02/08/2022    GLC 98 07/22/2019    BUN 14 02/08/2022    BUN 18 07/22/2019    CR 0.96 02/08/2022    CR 0.97 07/22/2019    GFRESTIMATED >90 02/08/2022    GFRESTIMATED >90 07/22/2019    GFRESTBLACK >90 07/22/2019    FERNANDO 9.5 02/08/2022    FERNANDO 9.4 07/22/2019        A1C RESULTS:  Lab Results   Component Value Date    A1C 5.3 07/22/2019       INR RESULTS:  No results found for: INR      Chad Moreira MD, FAC    CC  No referring provider defined for this encounter.    Thank you for allowing me to participate in the care of your patient.      Sincerely,     Chad Moreira MD     [facility]

## 2022-12-13 NOTE — PROGRESS NOTES
HISTORY:    Dakota France is a extremely pleasant 42-year-old gentleman who was seen in cardiology consultation about 6 weeks ago after developing a single episode of atrial fibrillation after eating ice cream.  He is generally healthy and fit and has a [id number] score of 0, so long-term anticoagulation was not recommended.  I had left it up to him as to whether he saw me again, and he is here today to discuss the outcomes of test that were done and discussed atrial ablation in general.    An echocardiogram was ordered and was discussed in detail today.  I actually pulled up images to review with him.  It was essentially normal except there was some mitral annular calcification and some aortic sclerosis noted.  There is slight thickening of the mitral annulus and the aortic valves are borderline thickened but neither are really substantially outside of normal.  I explained this to him.    Dakota reports that he is having occasional palpitations still and he has begun to recognize that they tend to be more if he gets inadequate sleep.  He is trying to get more sleep and has switched to a Mediterranean diet because of some borderline elevation of his cholesterol.  Overall he thinks his PACs are getting better.    I reviewed the results of his 48-hour Holter monitor which demonstrated 10% PACs and occasional PVCs but no atrial fibrillation or other significant arrhythmias.      ASSESSMENT/PLAN:    1.  Paroxysmal atrial fibrillation.  The patient has had only 1 documented episode.  Interestingly, he does report that his first episode occurred after eating ice cream and he tested to see if ice cream would cause it again and indeed it did but it lasted only a minute or so.  He has now been avoiding ice cream.  We talked about PACs and talked about the possibility of eventually using either a pill in the pocket, regular antiarrhythmic, or ablation as future therapies should his atrial fibrillation become more frequent.   I told him that he does not need to rush into an emergency room if he develops more atrial for but should probably go in within 24 to 36 hours so that it will still be safe to cardiovert him if it persists.  Again we will leave future visits open ended.  I would be happy to see him if he has other problems but as long as he is having no further episodes he does not need to follow-up with me.    Thank you for inviting me to participate in the care of your patient.  Please don't hesitate to call if I can be of further assistance.  35 minutes were spent today reviewing the chart and other records, interviewing and examining the patient, and documenting our visit.    Chart documentation was completed, in part, with [employer] voice-recognition software. Even though reviewed, some grammatical, spelling, and word errors may remain.       No orders of the defined types were placed in this encounter.    Orders Placed This Encounter   Medications     fish oil-omega-3 fatty acids 500 MG capsule     There are no discontinued medications.    10 year ASCVD risk: The 10-year ASCVD risk score (Otto DK, et al., 2019) is: 2%    Values used to calculate the score:      Age: 42 years      Sex: Male      Is Non- : No      Diabetic: No      Tobacco smoker: No      Systolic Blood Pressure: 130 mmHg      Is BP treated: No      HDL Cholesterol: 49 mg/dL      Total Cholesterol: 206 mg/dL    No diagnosis found.    CURRENT MEDICATIONS:  Current Outpatient Medications   Medication Sig Dispense Refill     fish oil-omega-3 fatty acids 500 MG capsule        magnesium 250 MG tablet Take 1 tablet by mouth daily 400 mg daily       apixaban ANTICOAGULANT (ELIQUIS) 5 MG tablet Take 5 mg by mouth 2 times daily (Patient not taking: Reported on 12/13/2022)       ID NOW COVID-19 KIT USE 1 KIT TODAY AS DIRECTED (Patient not taking: Reported on 11/1/2022)       metoprolol succinate ER (TOPROL XL) 25 MG 24 hr tablet Take 1 tablet (25  "mg) by mouth daily (Patient not taking: Reported on 12/13/2022) 90 tablet 3       ALLERGIES   No Known Allergies    PAST MEDICAL HISTORY:  Past Medical History:   Diagnosis Date     PAC (premature atrial contraction)        PAST SURGICAL HISTORY:  Past Surgical History:   Procedure Laterality Date     HERNIORRHAPHY INGUINAL BILATERAL         FAMILY HISTORY:  Family History   Problem Relation Age of Onset     Prostate Cancer Father      Abdominal Aortic Aneurysm Father 70     Prostate Problems Father        SOCIAL HISTORY:  Social History     Socioeconomic History     Marital status:      Spouse name: None     Number of children: None     Years of education: None     Highest education level: None   Tobacco Use     Smoking status: Former     Smokeless tobacco: Never   Substance and Sexual Activity     Alcohol use: Not Currently     Drug use: No     Sexual activity: Yes     Partners: Female       Review of Systems:  Skin:  Negative     Eyes:  Positive for contacts;glasses  ENT:  Negative    Respiratory:  Negative    Cardiovascular:    Positive for;palpitations  Gastroenterology: not assessed    Genitourinary:  not assessed    Musculoskeletal:  Negative    Neurologic:  Negative    Psychiatric:  Negative    Heme/Lymph/Imm:  Negative    Endocrine:  Negative      Physical Exam:  Vitals: /82 (BP Location: Right arm, Patient Position: Sitting, Cuff Size: Adult Regular)   Pulse 71   Ht 1.791 m (5' 10.5\")   Wt 84.1 kg (185 lb 4.8 oz)   SpO2 100%   BMI 26.21 kg/m      Constitutional:           Skin:           Head:           Eyes:           ENT:           Neck:           Chest:           Cardiac:                    Abdomen:           Vascular:                                        Extremities and Muscular Skeletal:              Neurological:           Psych:        Recent Lab Results:  LIPID RESULTS:  Lab Results   Component Value Date    CHOL 206 (H) 11/01/2022    CHOL 221 (H) 07/22/2019    HDL 49 11/01/2022 "    HDL 42 07/22/2019     (H) 11/01/2022     (H) 07/22/2019    TRIG 108 11/01/2022    TRIG 171 (H) 07/22/2019    CHOLHDLRATIO 4.9 05/11/2015       LIVER ENZYME RESULTS:  Lab Results   Component Value Date    AST 16 02/08/2022    AST 16 10/11/2019    ALT 27 02/08/2022    ALT 26 10/11/2019       CBC RESULTS:  Lab Results   Component Value Date    WBC 5.4 02/08/2022    WBC 5.3 07/22/2019    RBC 5.16 02/08/2022    RBC 5.21 07/22/2019    HGB 15.7 02/08/2022    HGB 15.5 07/22/2019    HCT 46.5 02/08/2022    HCT 45.5 07/22/2019    MCV 90 02/08/2022    MCV 87 07/22/2019    MCH 30.4 02/08/2022    MCH 29.8 07/22/2019    MCHC 33.8 02/08/2022    MCHC 34.1 07/22/2019    RDW 12.1 02/08/2022    RDW 11.5 07/22/2019     02/08/2022     07/22/2019       BMP RESULTS:  Lab Results   Component Value Date     02/08/2022     07/22/2019    POTASSIUM 4.2 02/08/2022    POTASSIUM 4.1 07/22/2019    CHLORIDE 105 02/08/2022    CHLORIDE 106 07/22/2019    CO2 28 02/08/2022    CO2 30 07/22/2019    ANIONGAP 5 02/08/2022    ANIONGAP 4 07/22/2019    GLC 90 02/08/2022    GLC 98 07/22/2019    BUN 14 02/08/2022    BUN 18 07/22/2019    CR 0.96 02/08/2022    CR 0.97 07/22/2019    GFRESTIMATED >90 02/08/2022    GFRESTIMATED >90 07/22/2019    GFRESTBLACK >90 07/22/2019    FERNANDO 9.5 02/08/2022    FERNANDO 9.4 07/22/2019        A1C RESULTS:  Lab Results   Component Value Date    A1C 5.3 07/22/2019       INR RESULTS:  No results found for: INR      Chad Moreira MD, [facility]    CC  No referring provider defined for this encounter.

## 2023-01-13 ENCOUNTER — OFFICE VISIT (OUTPATIENT)
Dept: FAMILY MEDICINE | Facility: CLINIC | Age: 43
End: 2023-01-13
Payer: COMMERCIAL

## 2023-01-13 VITALS
TEMPERATURE: 97.8 F | SYSTOLIC BLOOD PRESSURE: 124 MMHG | OXYGEN SATURATION: 100 % | RESPIRATION RATE: 16 BRPM | WEIGHT: 176.9 LBS | HEART RATE: 70 BPM | DIASTOLIC BLOOD PRESSURE: 80 MMHG | BODY MASS INDEX: 24.77 KG/M2 | HEIGHT: 71 IN

## 2023-01-13 DIAGNOSIS — Z12.5 PROSTATE CANCER SCREENING: ICD-10-CM

## 2023-01-13 DIAGNOSIS — Z12.11 SCREEN FOR COLON CANCER: ICD-10-CM

## 2023-01-13 DIAGNOSIS — Z00.00 ROUTINE GENERAL MEDICAL EXAMINATION AT A HEALTH CARE FACILITY: Primary | ICD-10-CM

## 2023-01-13 DIAGNOSIS — I48.0 PAROXYSMAL ATRIAL FIBRILLATION (H): ICD-10-CM

## 2023-01-13 DIAGNOSIS — Z80.42 FAMILY HISTORY OF PROSTATE CANCER IN FATHER: ICD-10-CM

## 2023-01-13 LAB — PSA SERPL-MCNC: 0.93 UG/L (ref 0–4)

## 2023-01-13 PROCEDURE — 90471 IMMUNIZATION ADMIN: CPT | Performed by: FAMILY MEDICINE

## 2023-01-13 PROCEDURE — 90472 IMMUNIZATION ADMIN EACH ADD: CPT | Performed by: FAMILY MEDICINE

## 2023-01-13 PROCEDURE — 36415 COLL VENOUS BLD VENIPUNCTURE: CPT | Performed by: FAMILY MEDICINE

## 2023-01-13 PROCEDURE — G0103 PSA SCREENING: HCPCS | Performed by: FAMILY MEDICINE

## 2023-01-13 PROCEDURE — 99396 PREV VISIT EST AGE 40-64: CPT | Mod: 25 | Performed by: FAMILY MEDICINE

## 2023-01-13 PROCEDURE — 90677 PCV20 VACCINE IM: CPT | Performed by: FAMILY MEDICINE

## 2023-01-13 PROCEDURE — 90746 HEPB VACCINE 3 DOSE ADULT IM: CPT | Performed by: FAMILY MEDICINE

## 2023-01-13 ASSESSMENT — ENCOUNTER SYMPTOMS
MYALGIAS: 0
ABDOMINAL PAIN: 0
PARESTHESIAS: 0
SHORTNESS OF BREATH: 0
FREQUENCY: 0
PALPITATIONS: 0
NAUSEA: 0
HEMATOCHEZIA: 0
EYE PAIN: 0
FEVER: 0
DYSURIA: 0
CONSTIPATION: 0
JOINT SWELLING: 0
HEMATURIA: 0
WEAKNESS: 0
COUGH: 0
HEARTBURN: 0
NERVOUS/ANXIOUS: 0
HEADACHES: 0
DIARRHEA: 0
SORE THROAT: 0
CHILLS: 0
ARTHRALGIAS: 0
DIZZINESS: 0

## 2023-01-13 ASSESSMENT — PAIN SCALES - GENERAL: PAINLEVEL: NO PAIN (0)

## 2023-01-13 NOTE — PROGRESS NOTES
SUBJECTIVE:   CC: Dakota is an 43 year old who presents for preventative health visit.       Patient has been advised of split billing requirements and indicates understanding: Yes  Healthy Habits:     Getting at least 3 servings of Calcium per day:  Yes    Bi-annual eye exam:  Yes    Dental care twice a year:  NO    Sleep apnea or symptoms of sleep apnea:  None    Diet:  Low fat/cholesterol    Frequency of exercise:  4-5 days/week    Duration of exercise:  30-45 minutes    Taking medications regularly:  Not Applicable    Medication side effects:  None    PHQ-2 Total Score: 0    Additional concerns today:  No              Today's PHQ-2 Score:   PHQ-2 ( 1999 [employer]) 1/13/2023   Q1: Little interest or pleasure in doing things 0   Q2: Feeling down, depressed or hopeless 0   PHQ-2 Score 0   PHQ-2 Total Score (12-17 Years)- Positive if 3 or more points; Administer PHQ-A if positive -   Q1: Little interest or pleasure in doing things Not at all   Q2: Feeling down, depressed or hopeless Not at all   PHQ-2 Score 0           Social History     Tobacco Use     Smoking status: Former     Smokeless tobacco: Never   Substance Use Topics     Alcohol use: Not Currently     If you drink alcohol do you typically have >3 drinks per day or >7 drinks per week? No    Alcohol Use 1/13/2023   Prescreen: >3 drinks/day or >7 drinks/week? Not Applicable   Prescreen: >3 drinks/day or >7 drinks/week? -       Last PSA:   PSA   Date Value Ref Range Status   07/22/2019 1.35 0 - 4 ug/L Final     Comment:     Assay Method:  Chemiluminescence using Siemens Vista analyzer     Prostate Specific Antigen Screen   Date Value Ref Range Status   08/23/2021 1.04 0.00 - 4.00 ug/L Final       Reviewed orders with patient. Reviewed health maintenance and updated orders accordingly - Yes  Lab work is in process  Labs reviewed in EPIC  BP Readings from Last 3 Encounters:   01/13/23 124/80   12/13/22 130/82   11/01/22 114/76    Wt Readings from Last 3 Encounters:    01/13/23 80.2 kg (176 lb 14.4 oz)   12/13/22 84.1 kg (185 lb 4.8 oz)   11/01/22 81.6 kg (180 lb)                  Patient Active Problem List   Diagnosis     H/O cardiac arrhythmia-PAC     Overweight (BMI 25.0-29.9)     CARDIOVASCULAR SCREENING; LDL GOAL LESS THAN 160     Skin tag     Family history of abdominal aortic aneurysm (AAA)     Family history of prostate cancer in father     Pigmented skin lesion     Elevated bilirubin     Cholesterolosis gallbladder     Digital mucous cyst of toe of left foot     Bilateral shoulder pain, unspecified chronicity     Seborrheic keratosis     Impingement syndrome of left shoulder     Paroxysmal atrial fibrillation (H)     Past Surgical History:   Procedure Laterality Date     HERNIORRHAPHY INGUINAL BILATERAL         Social History     Tobacco Use     Smoking status: Former     Smokeless tobacco: Never   Substance Use Topics     Alcohol use: Not Currently     Family History   Problem Relation Age of Onset     Prostate Cancer Father      Abdominal Aortic Aneurysm Father 70     Prostate Problems Father          Current Outpatient Medications   Medication Sig Dispense Refill     fish oil-omega-3 fatty acids 500 MG capsule        magnesium 250 MG tablet Take 1 tablet by mouth daily 400 mg daily       No Known Allergies  Recent Labs   Lab Test 11/01/22  1112 02/08/22  1631 08/23/21  1505 10/11/19  0859 08/12/19  1341 07/22/19  0945 05/11/15  0741 04/27/15  1452   A1C  --   --   --   --   --  5.3  --   --    *  --   --   --   --  145* 137*  --    HDL 49  --   --   --   --  42 43  --    TRIG 108  --   --   --   --  171* 145  --    ALT  --  27 23 26   < > 21  --   --    CR  --  0.96  --   --   --  0.97  --  1.06   GFRESTIMATED  --  >90  --   --   --  >90  --  78   GFRESTBLACK  --   --   --   --   --  >90  --  >90  African American GFR Calc     POTASSIUM  --  4.2  --   --   --  4.1  --  3.9   TSH 2.01  --   --   --   --  1.41  --  2.37    < > = values in this interval not  displayed.        Reviewed and updated as needed this visit by clinical staff   Tobacco  Allergies  Meds              Reviewed and updated as needed this visit by Provider                   Past Medical History:   Diagnosis Date     PAC (premature atrial contraction)       Past Surgical History:   Procedure Laterality Date     HERNIORRHAPHY INGUINAL BILATERAL       OB History   No obstetric history on file.       Review of Systems   Constitutional: Negative for chills and fever.   HENT: Negative for congestion, ear pain, hearing loss and sore throat.    Eyes: Negative for pain and visual disturbance.   Respiratory: Negative for cough and shortness of breath.    Cardiovascular: Negative for chest pain, palpitations and peripheral edema.   Gastrointestinal: Negative for abdominal pain, constipation, diarrhea, heartburn, hematochezia and nausea.   Genitourinary: Negative for dysuria, frequency, genital sores, hematuria, impotence, penile discharge and urgency.   Musculoskeletal: Negative for arthralgias, joint swelling and myalgias.   Skin: Negative for rash.   Neurological: Negative for dizziness, weakness, headaches and paresthesias.   Psychiatric/Behavioral: Negative for mood changes. The patient is not nervous/anxious.      CONSTITUTIONAL: NEGATIVE for fever, chills, change in weight  INTEGUMENTARY/SKIN: NEGATIVE for worrisome rashes, moles or lesions  EYES: NEGATIVE for vision changes or irritation  ENT: NEGATIVE for ear, mouth and throat problems  RESP: NEGATIVE for significant cough or SOB  CV: NEGATIVE for chest pain, palpitations or peripheral edema  CV: History of atrial fibrillation-paroxysmal  GI: NEGATIVE for nausea, abdominal pain, heartburn, or change in bowel habits   male: negative for dysuria, hematuria, decreased urinary stream, erectile dysfunction, urethral discharge  MUSCULOSKELETAL: NEGATIVE for significant arthralgias or myalgia  NEURO: NEGATIVE for weakness, dizziness or  "paresthesias  ENDOCRINE: NEGATIVE for temperature intolerance, skin/hair changes  HEME/ALLERGY/IMMUNE: NEGATIVE for bleeding problems  PSYCHIATRIC: NEGATIVE for changes in mood or affect    OBJECTIVE:   /80   Pulse 70   Temp 97.8  F (36.6  C) (Oral)   Resp 16   Ht 1.791 m (5' 10.5\")   Wt 80.2 kg (176 lb 14.4 oz)   SpO2 100%   BMI 25.02 kg/m      Physical Exam  GENERAL: healthy, alert and no distress  EYES: Eyes grossly normal to inspection, PERRL and conjunctivae and sclerae normal  HENT: ear canals and TM's normal, nose and mouth without ulcers or lesions  NECK: no adenopathy, no asymmetry, masses, or scars and thyroid normal to palpation  RESP: lungs clear to auscultation - no rales, rhonchi or wheezes  CV: regular rate and rhythm, normal S1 S2, no S3 or S4, no murmur, click or rub, no peripheral edema and peripheral pulses strong  ABDOMEN: soft, nontender, no hepatosplenomegaly, no masses and bowel sounds normal  MS: no gross musculoskeletal defects noted, no edema  SKIN: no suspicious lesions or rashes  NEURO: Normal strength and tone, mentation intact and speech normal  PSYCH: mentation appears normal, affect normal/bright    Diagnostic Test Results:  Labs reviewed in Epic    ASSESSMENT/PLAN:   (Z00.00) Routine general medical examination at a health care facility  (primary encounter diagnosis)  Comment:   Plan: : Discussed on regular exercises, healthy eating, and routine dental checks.    (Z12.11) Screen for colon cancer  Comment:   Plan: Colonoscopy Screening  Referral            (I48.0) Paroxysmal atrial fibrillation (H)  Comment:   Plan: Reviewed documents and reports from care everywhere  Reviewed recent cardiology visit note from normal 2022  Patient was on metoprolol and Eliquis for a month  He is currently asymptomatic,   will continue to monitor continue to avoid caffeine  Ensure good hydration, sleep hygiene, avoid stress  Reviewed recent labs including normal thyroid labs, " "elevated lipid panel-recommended to follow on Mediterranean diet, education handouts given to patient      (Z80.42) Family history of prostate cancer in father  Comment:   Plan: PSA, screen            (Z12.5) Prostate cancer screening  Comment:   Plan: PSA, screen                    COUNSELING:   Reviewed preventive health counseling, as reflected in patient instructions  Special attention given to:        Regular exercise       Healthy diet/nutrition       Vision screening       Immunizations    Vaccinated for: Hepatitis B and Pneumococcal             Colorectal cancer screening       Prostate cancer screening       The 10-year ASCVD risk score (Otto MERCER, et al., 2019) is: 2.1%    Values used to calculate the score:      Age: 43 years      Sex: Male      Is Non- : No      Diabetic: No      Tobacco smoker: No      Systolic Blood Pressure: 124 mmHg      Is BP treated: No      HDL Cholesterol: 49 mg/dL      Total Cholesterol: 206 mg/dL      BMI:   Estimated body mass index is 25.02 kg/m  as calculated from the following:    Height as of this encounter: 1.791 m (5' 10.5\").    Weight as of this encounter: 80.2 kg (176 lb 14.4 oz).   Weight management plan: Discussed healthy diet and exercise guidelines      He reports that he has quit smoking. He has never used smokeless tobacco.      Chart documentation done in part with [employer] Voice recognition Software. Although reviewed after completion, some word and grammatical error may remain.    Kris Bernardo MD  [facility]  "

## 2023-01-17 NOTE — RESULT ENCOUNTER NOTE
Melo Duncan,  Your PSA-prostate cancer screening test is in normal range, this is good.   Let me know if you have any questions. Take care.  Kris Bernardo MD

## 2023-02-14 ENCOUNTER — ALLIED HEALTH/NURSE VISIT (OUTPATIENT)
Dept: FAMILY MEDICINE | Facility: CLINIC | Age: 43
End: 2023-02-14
Payer: COMMERCIAL

## 2023-02-14 DIAGNOSIS — Z23 NEED FOR VACCINATION: Primary | ICD-10-CM

## 2023-02-14 PROCEDURE — 99207 PR NO CHARGE NURSE ONLY: CPT

## 2023-02-14 PROCEDURE — 90471 IMMUNIZATION ADMIN: CPT

## 2023-02-14 PROCEDURE — 90746 HEPB VACCINE 3 DOSE ADULT IM: CPT

## 2023-02-14 NOTE — PROGRESS NOTES
Prior to immunization administration, verified patients identity using patient s name and date of birth. Please see Immunization Activity for additional information.     Screening Questionnaire for Adult Immunization    Are you sick today?   No   Do you have allergies to medications, food, a vaccine component or latex?   No   Have you ever had a serious reaction after receiving a vaccination?   No   Do you have a long-term health problem with heart, lung, kidney, or metabolic disease (e.g., diabetes), asthma, a blood disorder, no spleen, complement component deficiency, a cochlear implant, or a spinal fluid leak?  Are you on long-term aspirin therapy?   No   Do you have cancer, leukemia, HIV/AIDS, or any other immune system problem?   No   Do you have a parent, brother, or sister with an immune system problem?   No   In the past 3 months, have you taken medications that affect  your immune system, such as prednisone, other steroids, or anticancer drugs; drugs for the treatment of rheumatoid arthritis, Crohn s disease, or psoriasis; or have you had radiation treatments?   No   Have you had a seizure, or a brain or other nervous system problem?   No   During the past year, have you received a transfusion of blood or blood    products, or been given immune (gamma) globulin or antiviral drug?   No   For women: Are you pregnant or is there a chance you could become       pregnant during the next month?   No   Have you received any vaccinations in the past 4 weeks?   No     Immunization questionnaire answers were all negative.       injection of Hep B given by Cassandra Galarza RN. Patient instructed to remain in clinic for 15 minutes afterwards, and to report any adverse reaction to me immediately.       Screening performed by Cassandra Galarza RN on 2/14/2023 at 3:54 PM.

## 2023-03-13 ENCOUNTER — TELEPHONE (OUTPATIENT)
Dept: GASTROENTEROLOGY | Facility: CLINIC | Age: 43
End: 2023-03-13
Payer: COMMERCIAL

## 2023-03-13 ENCOUNTER — HOSPITAL ENCOUNTER (OUTPATIENT)
Facility: AMBULATORY SURGERY CENTER | Age: 43
End: 2023-03-13
Attending: SURGERY
Payer: COMMERCIAL

## 2023-03-13 NOTE — TELEPHONE ENCOUNTER
Screening Questions  BLUE  KIND OF PREP RED  LOCATION [review exclusion criteria] GREEN  SEDATION TYPE        y Are you active on mychart?       [facility] Ordering/Referring Provider?        [facility] What type of coverage do you have?      n Have you had a positive covid test in the last 14 days?     25.0 1. BMI  [BMI 40+ - review exclusion criteria]    y  2. Are you able to give consent for your medical care? [IF NO,RN REVIEW]          n  3. Are you taking any prescription pain medications on a routine schedule   (ex narcotics: oxycodone, roxicodone, oxycontin,  and percocet)? [RN Review]        n  3a. EXTENDED PREP What kind of prescription?     n 4. Do you have any chemical dependencies such as alcohol, street drugs, or methadone?        **If yes 3- 5 , please schedule with MAC sedation.**          IF YES TO ANY 6 - 10 - HOSPITAL SETTING ONLY.     n 6.   Do you need assistance transferring?     n 7.   Have you had a heart or lung transplant?    n 8.   Are you currently on dialysis?   n 9.   Do you use daily home oxygen?   n 10. Do you take nitroglycerin?   10a. n If yes, how often?     11. [FEMALES]   Are you currently pregnant?    11a.  If yes, how many weeks? [ Greater than 12 weeks, OR NEEDED]    n 12. Do you have Pulmonary Hypertension? *NEED PAC APPT AT [facility] w/ [facility]*     n 13. [review exclusion criteria]  Do you have any implantable devices in your body (pacemaker, defib, LVAD)?    n 14. In the past 6 months, have you had any heart related issues including cardiomyopathy or heart attack?     14a. n If yes, did it require cardiac stenting if so when?     n 15. Have you had a stroke or Transient ischemic attack (TIA - aka  mini stroke ) within 6 months?      n 16. Do you have mod to severe Obstructive Sleep Apnea?  [Hospital only]    n 17. Do you have SEVERE AND UNCONTROLLED asthma? *NEED PAC APPT AT [facility] w/MAC*     18. Are you currently taking any blood thinners?     18a. No. Continue to 19.   18b.     n 19. Do  "you take the medication Phentermine?    19a. If yes, \"Hold for 7 days before procedure.  Please consult your prescribing provider if you have questions about holding this medication.\"     n  20. Do you have chronic kidney disease?      n  21. Do you have a diagnosis of diabetes?     n  22. On a regular basis do you go 3-5 days between bowel movements?      23. Preferred LOCAL Pharmacy for Pre Prescription    [ LIST ONLY ONE PHARMACY]     [facility]/PHARMACY #[id number] [address] [facility] [place] [facility]        - CLOSING REMINDERS -    Informed patient they will need an adult    Cannot take any type of public or medical transportation alone    Conscious Sedation- Needs  for 6 hours after the procedure       MAC/General-Needs  for 24 hours after procedure    Pre-Procedure Covid test to be completed [[facility] PCR Testing Required]    Confirmed Nurse will call to complete assessment       - SCHEDULING DETAILS -  n Hospital Setting Required? If yes, what is the exclusion?:    Libra  Surgeon    5/2/23  Date of Procedure  Lower Endoscopy [Colonoscopy]  Type of Procedure Scheduled  [facility]-If you answer yes to questions #8, #20, #21Which Colonoscopy Prep was Sent?     CS Sedation Type     n PAC / Pre-op Required                 "

## 2023-04-17 ENCOUNTER — TELEPHONE (OUTPATIENT)
Dept: GASTROENTEROLOGY | Facility: CLINIC | Age: 43
End: 2023-04-17
Payer: COMMERCIAL

## 2023-04-17 NOTE — TELEPHONE ENCOUNTER
Caller: Dakota France  Reason for Reschedule/Cancellation (please be detailed, any staff messages or encounters to note?): WORK CONFLICT      Prior to reschedule please review:    Ordering Provider:CARLA    Sedation per order:MODERATE    Does patient have any ASC Exclusions, please identify?: N      Notes on Cancelled Procedure:    Procedure:Lower Endoscopy [Colonoscopy]     Date: 5/2/23    Location:[facility] [place]; [address], [place] [address]    Surgeon: ANTWON        Rescheduled: No      HE WILL CALL BACK IN ABOUT 1 MONTH NEEDS September AND ONLY EARLY MORNING APPOINTMENTS TO OFFER HIM AT THIS TIME AND HE CAN'T GO EARLY WITH KIDS

## 2023-07-20 ENCOUNTER — ALLIED HEALTH/NURSE VISIT (OUTPATIENT)
Dept: FAMILY MEDICINE | Facility: CLINIC | Age: 43
End: 2023-07-20
Payer: COMMERCIAL

## 2023-07-20 DIAGNOSIS — Z23 ENCOUNTER FOR IMMUNIZATION: Primary | ICD-10-CM

## 2023-07-20 PROCEDURE — 90471 IMMUNIZATION ADMIN: CPT

## 2023-07-20 PROCEDURE — 90746 HEPB VACCINE 3 DOSE ADULT IM: CPT

## 2023-07-20 PROCEDURE — 99207 PR NO CHARGE NURSE ONLY: CPT

## 2023-07-20 NOTE — PROGRESS NOTES
Prior to immunization administration, verified patients identity using patient s name and date of birth. Please see Immunization Activity for additional information.     Screening Questionnaire for Adult Immunization    Are you sick today?   No   Do you have allergies to medications, food, a vaccine component or latex?   No   Have you ever had a serious reaction after receiving a vaccination?   No   Do you have a long-term health problem with heart, lung, kidney, or metabolic disease (e.g., diabetes), asthma, a blood disorder, no spleen, complement component deficiency, a cochlear implant, or a spinal fluid leak?  Are you on long-term aspirin therapy?   No   Do you have cancer, leukemia, HIV/AIDS, or any other immune system problem?   No   Do you have a parent, brother, or sister with an immune system problem?   No   In the past 3 months, have you taken medications that affect  your immune system, such as prednisone, other steroids, or anticancer drugs; drugs for the treatment of rheumatoid arthritis, Crohn s disease, or psoriasis; or have you had radiation treatments?   No   Have you had a seizure, or a brain or other nervous system problem?   No   During the past year, have you received a transfusion of blood or blood    products, or been given immune (gamma) globulin or antiviral drug?   No   For women: Are you pregnant or is there a chance you could become       pregnant during the next month?   No   Have you received any vaccinations in the past 4 weeks?   No     Immunization questionnaire answers were all negative.    I have reviewed the following standing orders:   This patient is due and qualifies for the Hepatitis B vaccine.    Click here for Hepatitis B Standing Order    I have reviewed the vaccines inclusion and exclusion criteria; No concerns regarding eligibility.         Patient instructed to remain in clinic for 15 minutes afterwards, and to report any adverse reactions.     Screening performed by  Shirin Glass MA on 7/20/2023 at 3:35 PM.

## 2023-09-14 ENCOUNTER — TELEPHONE (OUTPATIENT)
Dept: FAMILY MEDICINE | Facility: CLINIC | Age: 43
End: 2023-09-14
Payer: COMMERCIAL

## 2023-09-14 DIAGNOSIS — L81.9 CHANGING PIGMENTED SKIN LESION: Primary | ICD-10-CM

## 2023-09-14 NOTE — TELEPHONE ENCOUNTER
Pt's wife calling in to ask if PCP can put in an urgent derm ref. Pt has a weird growth below his belly button that is growing and changing and looks very concerning. Pt had something similar to this in the exact same area removed already in 2019 (see enc on 11/12/19) Pt called derm to sched and they aren't able to get him in until March. However if PCP puts in urgent derm ref they would be able to see pt sooner.

## 2023-09-15 ENCOUNTER — TELEPHONE (OUTPATIENT)
Dept: DERMATOLOGY | Facility: CLINIC | Age: 43
End: 2023-09-15
Payer: COMMERCIAL

## 2023-09-15 NOTE — TELEPHONE ENCOUNTER
RN called patient and LVM to call clinic at [phone].     If patient calls back please relay provider message below. Number to call and schedule dermatology appointment: [phone].    CORAL Donald  [facility]

## 2023-09-15 NOTE — TELEPHONE ENCOUNTER
This encounter is being sent to inform the clinic that this patient has a referral from Kris Bernardo MD in [facility]  for the diagnoses of Rapidly changing pigmented skin lesion  and has requested that this patient be seen within Priority: 1-2 Weeks.  Based on the availability of our provider(s), we are unable to accommodate this request.    Were all sites offered this patient?  Patient has scheduled herself    Does scheduling algorithm request to schedule next available?  Patient has been scheduled for the first available opening with Lamberto Crawley MD  on 3/11/2024.  We have informed the patient that the clinic will review their referral and reach out if a sooner appointment is medically necessary.

## 2023-09-15 NOTE — TELEPHONE ENCOUNTER
Dermatology referral made as a priority consult for rapidly changing pigmented skin lesion  Please update patient

## 2023-09-18 NOTE — TELEPHONE ENCOUNTER
RN called patient on 9/18/2023 and LVM to call clinic at [phone].      If patient calls back please relay provider message below. Number to call and schedule dermatology appointment: [phone].     CORAL Donald  [facility]

## 2023-09-18 NOTE — TELEPHONE ENCOUNTER
Writer called pt, no answer. Left message for pt to return our call at [phone].     Vernell Barnes RN on 9/18/2023 at 2:24 PM

## 2023-09-19 NOTE — TELEPHONE ENCOUNTER
See telephone encounter for dermatology dept 9/15/2023. Staff has already tried to reach patient to schedule appointment. Izzui message sent to patient with update.     CORAL Donald  [facility] Primary Care Triage

## 2023-09-25 ENCOUNTER — TELEPHONE (OUTPATIENT)
Dept: GASTROENTEROLOGY | Facility: CLINIC | Age: 43
End: 2023-09-25

## 2023-09-25 ENCOUNTER — OFFICE VISIT (OUTPATIENT)
Dept: DERMATOLOGY | Facility: CLINIC | Age: 43
End: 2023-09-25
Payer: COMMERCIAL

## 2023-09-25 DIAGNOSIS — M71.30 MYXOID CYST: ICD-10-CM

## 2023-09-25 DIAGNOSIS — L82.0 INFLAMED SEBORRHEIC KERATOSIS: Primary | ICD-10-CM

## 2023-09-25 PROCEDURE — 99203 OFFICE O/P NEW LOW 30 MIN: CPT | Mod: 25 | Performed by: DERMATOLOGY

## 2023-09-25 PROCEDURE — 17110 DESTRUCTION B9 LES UP TO 14: CPT | Performed by: DERMATOLOGY

## 2023-09-25 NOTE — LETTER
9/25/2023         RE: Dakota France  [address] [place] [id number]        Dear Colleague,    Thank you for referring your patient, Dakota France, to the [facility]. Please see a copy of my visit note below.    [facility] Dermatology Note    Encounter Date: Sep 25, 2023      Dermatology Problem List:  1. Inflamed seborrheic keratoses on abdomen, prior biopsy (2019), s/p EF&C 9/25/23  2. Digital myxoid cyst of toe of left foot  - prior surgery, recurrent  - interested in sclerotherapy    ______________________________________    Impression/Plan:  Digital myxoid cyst: prior excision by orthopedics but recurrent. Patient interested in pursuing sclerotherapy as alternative treatment. I sent a message to the vascular team that performs these treatments to see if they would do this for a myxoid cyst. I'll also place a referral to orthopedics with this question. Patient is not interested in repeat surgery.    2. Inflamed seborrheic keratoses x2 on the abdomen: recalcitrant to treatment, EF&C today.  -The risks and benefits of the procedure were described to the patient. These include but are not limited to bleeding, infection, scar, incomplete removal, and recurrence. The above site was cleansed with an alcohol pad and injected with 1% lidocaine with epinephrine. The lesion was curetted and this was followed by electrofulguration. Petrolatum ointment and a bandage were applied to the wound. The patient tolerated the procedure well and was given post care instructions.    Clinical Follow-up: PRN       Staff Involved:  Scribe Disclosure:   GRISELDA, CASPER GARCIA, am serving as a scribe; to document services personally performed by Lamberto Crawley MD -based on data collection and the provider's statements to me.     Provider Disclosure:   The documentation recorded by the scribe accurately reflects the services I personally performed and the decisions made by  me.    Lamberto Crawley MD   of Dermatology  Department of Dermatology  [facility]          CC:   Chief Complaint   Patient presents with     Derm Problem     Patient here for a mole check, mole is located on belt line has had removed 3 times keeps coming back also has a cyst on L foot had removed its also edmundo looking for a different option on treating it.        History of Present Illness:  Mr. Dakota France is a 43 year old male who presents as a return patient. He had a cyst on his toe surgically removed one year ago which has recurred. He has to wear steel toe boots at work and states it is causing pain. He has looked into different therapies and is inquiring on treatments. He is interested in vascular treatment.    He also has a lesion on his belt line which he has had cut off approximately four years ago and is recurring.      Physical exam:  Vitals: There were no vitals taken for this visit.  GEN: not in acute distress  SKIN:   - L second toe erythematous edematous papule on distal phalanx  - inferior abdomen: 2 brown stuck-on appearing verrucous papules                Past Medical History:   Past Medical History:   Diagnosis Date     PAC (premature atrial contraction)      Past Surgical History:   Procedure Laterality Date     HERNIORRHAPHY INGUINAL BILATERAL         Social History:   reports that he has quit smoking. He has never used smokeless tobacco. He reports that he does not currently use alcohol. He reports that he does not use drugs.    Family History:  Family History   Problem Relation Age of Onset     Prostate Cancer Father      Abdominal Aortic Aneurysm Father 70     Prostate Problems Father        Medications:  Current Outpatient Medications   Medication Sig Dispense Refill     fish oil-omega-3 fatty acids 500 MG capsule        magnesium 250 MG tablet Take 1 tablet by mouth daily 400 mg daily       No Known Allergies                Again,  thank you for allowing me to participate in the care of your patient.        Sincerely,        Lamberto Crawley MD

## 2023-09-25 NOTE — NURSING NOTE
Dakota France's goals for this visit include:   Chief Complaint   Patient presents with    Derm Problem     Patient here for a mole check, mole is located on belt line has had removed 3 times keeps coming back also has a cyst on L foot had removed its also edmundo looking for a different option on treating it.        He requests these members of his care team be copied on today's visit information:     PCP: Kris Bernardo    Referring Provider:  Kris Bernardo MD  [address] [place] [address]    There were no vitals taken for this visit.    Do you need any medication refills at today's visit?       Michelle Joseph EMT

## 2023-09-25 NOTE — PROGRESS NOTES
[facility] Dermatology Note    Encounter Date: Sep 25, 2023      Dermatology Problem List:  1. Inflamed seborrheic keratoses on abdomen, prior biopsy (2019), s/p EF&C 9/25/23  2. Digital myxoid cyst of toe of left foot  - prior surgery, recurrent  - interested in sclerotherapy    ______________________________________    Impression/Plan:  Digital myxoid cyst: prior excision by orthopedics but recurrent. Patient interested in pursuing sclerotherapy as alternative treatment. I sent a message to the vascular team that performs these treatments to see if they would do this for a myxoid cyst. I'll also place a referral to orthopedics with this question. Patient is not interested in repeat surgery.    2. Inflamed seborrheic keratoses x2 on the abdomen: recalcitrant to treatment, EF&C today.  -The risks and benefits of the procedure were described to the patient. These include but are not limited to bleeding, infection, scar, incomplete removal, and recurrence. The above site was cleansed with an alcohol pad and injected with 1% lidocaine with epinephrine. The lesion was curetted and this was followed by electrofulguration. Petrolatum ointment and a bandage were applied to the wound. The patient tolerated the procedure well and was given post care instructions.    Clinical Follow-up: PRN       Staff Involved:  Scribe Disclosure:   GRISELDA, CASPER GARCIA, am serving as a scribe; to document services personally performed by Lamberto Crawley MD -based on data collection and the provider's statements to me.     Provider Disclosure:   The documentation recorded by the scribe accurately reflects the services I personally performed and the decisions made by me.    Lamberto Crawley MD   of Dermatology  Department of Dermatology  [facility]          CC:   Chief Complaint   Patient presents with    Derm Problem     Patient here for a mole check, mole is located on  belt line has had removed 3 times keeps coming back also has a cyst on L foot had removed its also edmundo looking for a different option on treating it.        History of Present Illness:  Mr. Dakota France is a 43 year old male who presents as a return patient. He had a cyst on his toe surgically removed one year ago which has recurred. He has to wear steel toe boots at work and states it is causing pain. He has looked into different therapies and is inquiring on treatments. He is interested in vascular treatment.    He also has a lesion on his belt line which he has had cut off approximately four years ago and is recurring.      Physical exam:  Vitals: There were no vitals taken for this visit.  GEN: not in acute distress  SKIN:   - L second toe erythematous edematous papule on distal phalanx  - inferior abdomen: 2 brown stuck-on appearing verrucous papules                Past Medical History:   Past Medical History:   Diagnosis Date    PAC (premature atrial contraction)      Past Surgical History:   Procedure Laterality Date    HERNIORRHAPHY INGUINAL BILATERAL         Social History:   reports that he has quit smoking. He has never used smokeless tobacco. He reports that he does not currently use alcohol. He reports that he does not use drugs.    Family History:  Family History   Problem Relation Age of Onset    Prostate Cancer Father     Abdominal Aortic Aneurysm Father 70    Prostate Problems Father        Medications:  Current Outpatient Medications   Medication Sig Dispense Refill    fish oil-omega-3 fatty acids 500 MG capsule       magnesium 250 MG tablet Take 1 tablet by mouth daily 400 mg daily       No Known Allergies

## 2023-09-25 NOTE — PROGRESS NOTES
***message vascular re: sclerotherapy for myxoid cyst    Erythematous edematous papule along joint space of left second toe

## 2023-09-25 NOTE — TELEPHONE ENCOUNTER
"Endoscopy Scheduling Screen    Have you had a positive Covid test in the last 14 days?  No    Are you active on MyChart?   Yes    What insurance is in the chart?  Other:  [facility]    Ordering/Referring Provider:     RENÉE AGUIRRE      (If ordering provider performs procedure, schedule with ordering provider unless otherwise instructed. )    BMI: Estimated body mass index is 25.02 kg/m  as calculated from the following:    Height as of 1/13/23: 1.791 m (5' 10.5\").    Weight as of 1/13/23: 80.2 kg (176 lb 14.4 oz).     Sedation Ordered  moderate sedation.   If patient BMI > 50 do not schedule in [facility].    If patient BMI > 45 do not schedule at [facility].    Are you taking methadone or Suboxone?  No    Are you taking any prescription medications for pain 3 or more times per week?   No    Do you have a history of malignant hyperthermia or adverse reaction to anesthesia?  No    (Females) Are you currently pregnant?   No     Have you been diagnosed or told you have pulmonary hypertension?   No    Do you have an LVAD?  No    Have you been told you have moderate to severe sleep apnea?  No    Have you been told you have COPD, asthma, or any other lung disease?  No    Do you have any heart conditions?  No     Have you ever had an organ transplant?   No    Have you ever had or are you awaiting a heart or lung transplant?   No    Have you had a stroke or transient ischemic attack (TIA aka \"mini stroke\" in the last 6 months?   No    Have you been diagnosed with or been told you have cirrhosis of the liver?   No    Are you currently on dialysis?   No    Do you need assistance transferring?   No    BMI: Estimated body mass index is 25.02 kg/m  as calculated from the following:    Height as of 1/13/23: 1.791 m (5' 10.5\").    Weight as of 1/13/23: 80.2 kg (176 lb 14.4 oz).     Is patients BMI > 40 and scheduling location [facility]?  No    Do you take an injectable medication for weight loss or diabetes (excluding insulin)?  No    Do you take the " medication Naltrexone?  No    Do you take blood thinners?  No       Prep   Are you currently on dialysis or do you have chronic kidney disease?  No    Do you have a diagnosis of diabetes?  No    Do you have a diagnosis of cystic fibrosis (CF)?  No    On a regular basis do you go 3 -5 days between bowel movements?  No    BMI > 40?  No    Preferred Pharmacy:    [facility]/pharmacy #[id number] - [place], [place] - [address] [address]  [address] [address] [address]  Phone: [phone] Fax: [phone]      Final Scheduling Details   Colonoscopy prep sent?  Standard MiraLAX    Procedure scheduled  Colonoscopy    Surgeon:  Najma     Date of procedure:  11/14/2023     Pre-OP / PAC:   No - Not required for this site.    Location  [facility] - [facility] - Per order.    Sedation   Moderate Sedation - Per order.      Patient Reminders:   You will receive a call from a Nurse to review instructions and health history.  This assessment must be completed prior to your procedure.  Failure to complete the Nurse assessment may result in the procedure being cancelled.      On the day of your procedure, please designate an adult(s) who can drive you home stay with you for the next 24 hours. The medicines used in the exam will make you sleepy. You will not be able to drive.      You cannot take public transportation, ride share services, or non-medical taxi service without a responsible caregiver.  Medical transport services are allowed with the requirement that a responsible caregiver will receive you at your destination.  We require that drivers and caregivers are confirmed prior to your procedure.

## 2023-10-05 ENCOUNTER — OFFICE VISIT (OUTPATIENT)
Dept: VASCULAR SURGERY | Facility: CLINIC | Age: 43
End: 2023-10-05
Payer: COMMERCIAL

## 2023-10-05 VITALS — HEART RATE: 74 BPM | SYSTOLIC BLOOD PRESSURE: 138 MMHG | DIASTOLIC BLOOD PRESSURE: 97 MMHG | OXYGEN SATURATION: 100 %

## 2023-10-05 DIAGNOSIS — M67.472 DIGITAL MUCOUS CYST OF TOE OF LEFT FOOT: Primary | ICD-10-CM

## 2023-10-05 PROCEDURE — 99202 OFFICE O/P NEW SF 15 MIN: CPT

## 2023-10-05 NOTE — PROGRESS NOTES
INTERVENTIONAL RADIOLOGY CLINIC NOTE  10/05/23    Impression/Plan:  Dakota France is a 43 year old male with history of a myxoid cyst of left second digit, previously treated by [facility] on 3/1/22.   Dr. Ambrose Esteves does not offer nor recommend IR sclerotherapy at this time, and instead recommends seeking out further opinions.    Referral to see Genaro Marshall in Podiatry has been sent.    Patient was educated on the benefits and risks of treating his cyst with sclerotherapy, with Dr. Esteves specifically addressing the high risk of sclerosing into patient's joint space due to the small size, which would result in significant pain and making patient's joint permanently immobile.     Indication/History:  Dakota France is a 43 year old male with paroxysmal Afib, left shoulder impingement syndrome, seborrheic keratosis and a left digit mucous cyst. He presents today to IR clinic out of his own volition to discuss treatment options for his left myxoid cyst that he has had for the past couple of years. He states that it will swell up with fluid (increasing the size x 3, compared to photo below) where is becomes incredibly painful, and then will pop or burst on its own, eliminating the pain. In March of 2022 he had Orthopedic surgery try to remove his cyst but he states that it came back the same day of the procedure. He works as an  and is up on his feet a lot.     Physical Exam:      Kelly Clements PA-C  Interventional Radiology  [phone] (IR control)  [phone] (pager)    =====    Past Medical History:  Past Medical History:   Diagnosis Date    PAC (premature atrial contraction)        Past Surgical History:  Past Surgical History:   Procedure Laterality Date    HERNIORRHAPHY INGUINAL BILATERAL         Problem List:  Patient Active Problem List    Diagnosis Date Noted    Paroxysmal atrial fibrillation (H) 01/13/2023     Priority: Medium    Digital mucous cyst of toe of left foot  08/23/2021     Priority: Medium    Bilateral shoulder pain, unspecified chronicity 08/23/2021     Priority: Medium    Seborrheic keratosis 08/23/2021     Priority: Medium    Cholesterolosis gallbladder 06/29/2020     Priority: Medium    Elevated bilirubin 08/13/2019     Priority: Medium    Pigmented skin lesion 07/22/2019     Priority: Medium    Family history of abdominal aortic aneurysm (AAA) 04/11/2018     Priority: Medium    Family history of prostate cancer in father 04/11/2018     Priority: Medium    Skin tag 03/21/2017     Priority: Medium    H/O cardiac arrhythmia-PAC 04/27/2015     Priority: Medium    Overweight (BMI 25.0-29.9) 04/27/2015     Priority: Medium    CARDIOVASCULAR SCREENING; LDL GOAL LESS THAN 160 04/27/2015     Priority: Medium    Impingement syndrome of left shoulder 09/05/2013     Priority: Medium       Medications:  Prescription Medications as of 10/5/2023         Rx Number Disp Refills Start End Last Dispensed Date Next Fill Date Owning Pharmacy    fish oil-omega-3 fatty acids 500 MG capsule            Class: Historical    Route: Oral    magnesium 250 MG tablet            Sig: Take 1 tablet by mouth daily 400 mg daily    Class: Historical    Route: Oral          Allergies:  No Known Allergies    Results Reviewed:  Lab Results   Component Value Date     02/08/2022     07/22/2019     No results found for: INR    Vital Signs:  BP (!) 138/97 (BP Location: Right arm, Patient Position: Sitting, Cuff Size: Adult Regular)   Pulse 74   SpO2 100%     =====    A total of 15 minutes was spent with the patient. Greater than 50% of the time was spent in counseling, education, and coordination of care.    CC:  1) Referring Provider  No referring provider defined for this encounter.    2) Primary Care Provider  Kris Bernardo MD  [address] [place] [address]

## 2023-10-05 NOTE — NURSING NOTE
Chief Complaint   Patient presents with    Consult     Sclerotherapy of Myxoid cyst.       Medications and allergies reconciled.  Vitals collected.    Nilda Menard LPN

## 2023-10-12 NOTE — TELEPHONE ENCOUNTER
DIAGNOSIS: Digital mucous cyst of toe of left foot [M67.472]    APPOINTMENT DATE: 11/20/23   NOTES STATUS DETAILS   OFFICE NOTE from referring provider Internal 10/5/23 RAMILA FISCHER MD    OFFICE NOTE from other specialist Internal 9/25/23 OV MARGI SHRESTHA MD - INFLAMED SEBORRHEIC KERATOSIS   MEDICATION LIST Internal    LABS

## 2023-10-24 ENCOUNTER — OFFICE VISIT (OUTPATIENT)
Dept: PODIATRY | Facility: CLINIC | Age: 43
End: 2023-10-24
Attending: DERMATOLOGY
Payer: COMMERCIAL

## 2023-10-24 DIAGNOSIS — M71.30 MYXOID CYST: ICD-10-CM

## 2023-10-24 PROCEDURE — 99203 OFFICE O/P NEW LOW 30 MIN: CPT | Performed by: PODIATRIST

## 2023-10-24 NOTE — NURSING NOTE
Dakota France's chief complaint for this visit includes:  Chief Complaint   Patient presents with    Consult     Cyst on left foot, has had the cyst for around 4 years, has had a surgery about a year and a half and it came back, has went to different providers for the cyst, exploring other treatment options     PCP: Kris Bernardo    Referring Provider:  Lamberto Crawley MD  [address] [place] [id number]    There were no vitals taken for this visit.  Data Unavailable     Do you need any medication refills at today's visit? NO    No Known Allergies    Victoria Manuel LPN

## 2023-10-24 NOTE — LETTER
10/24/2023         RE: Dakota France  [address] [place] [id number]        Dear Colleague,    Thank you for referring your patient, Dakota France, to the [facility]. Please see a copy of my visit note below.    Past Medical History:   Diagnosis Date     PAC (premature atrial contraction)      Patient Active Problem List   Diagnosis     H/O cardiac arrhythmia-PAC     Overweight (BMI 25.0-29.9)     CARDIOVASCULAR SCREENING; LDL GOAL LESS THAN 160     Skin tag     Family history of abdominal aortic aneurysm (AAA)     Family history of prostate cancer in father     Pigmented skin lesion     Elevated bilirubin     Cholesterolosis gallbladder     Digital mucous cyst of toe of left foot     Bilateral shoulder pain, unspecified chronicity     Seborrheic keratosis     Impingement syndrome of left shoulder     Paroxysmal atrial fibrillation (H)     Past Surgical History:   Procedure Laterality Date     HERNIORRHAPHY INGUINAL BILATERAL       Social History     Socioeconomic History     Marital status:      Spouse name: Not on file     Number of children: Not on file     Years of education: Not on file     Highest education level: Not on file   Occupational History     Not on file   Tobacco Use     Smoking status: Former     Smokeless tobacco: Never   Vaping Use     Vaping Use: Never used   Substance and Sexual Activity     Alcohol use: Not Currently     Drug use: No     Sexual activity: Yes     Partners: Female   Other Topics Concern     Parent/sibling w/ CABG, MI or angioplasty before 65F 55M? Not Asked   Social History Narrative     Not on file     Social Determinants of Health     Financial Resource Strain: Not on file   Food Insecurity: Not on file   Transportation Needs: Not on file   Physical Activity: Not on file   Stress: Not on file   Social Connections: Not on file   Interpersonal Safety: Not on file   Housing Stability: Not on file     Family History   Problem  Relation Age of Onset     Prostate Cancer Father      Abdominal Aortic Aneurysm Father 70     Prostate Problems Father                Subjective findings 43-year-old presents for left second toe PIPJ myxoid cyst.  Relates has been present for about 4 years duration, started maybe with tight shoes and he had a still toe boots at work that rubbed on it, hurts at times, does not hurt now but it just drained, relates it does periodically drain and then gets bigger again.  Relates it popped about 3 weeks ago.  Relates he had it surgically removed about 1 year ago and it came right back.  He relates he seen vascular and they sent him here.  I did review vascular's 10/5/2023 note.    Objective findings- DP and PT are 2 out of 4 left.  Has a left DIPJ subcutaneous fluid-filled well-defined dorsal lateral lesion.  There is no erythema, no drainage, no odor, no calor, positive edema, no pain on palpation.  Has distally contracted digits 2 through 5 left foot.    Assessment and plan- Cyst left DIPJ of the second toe.  Mallet toe present.  Diagnosis and treatment options discussed with the patient.  Patient referred to orthopedic surgery for any surgical options and use discussed with them.  Return to clinic and see me as needed.            Low level of medical decision making.      Again, thank you for allowing me to participate in the care of your patient.        Sincerely,        Rickey Oglesby DPM

## 2023-10-24 NOTE — H&P (VIEW-ONLY)
Past Medical History:   Diagnosis Date    PAC (premature atrial contraction)      Patient Active Problem List   Diagnosis    H/O cardiac arrhythmia-PAC    Overweight (BMI 25.0-29.9)    CARDIOVASCULAR SCREENING; LDL GOAL LESS THAN 160    Skin tag    Family history of abdominal aortic aneurysm (AAA)    Family history of prostate cancer in father    Pigmented skin lesion    Elevated bilirubin    Cholesterolosis gallbladder    Digital mucous cyst of toe of left foot    Bilateral shoulder pain, unspecified chronicity    Seborrheic keratosis    Impingement syndrome of left shoulder    Paroxysmal atrial fibrillation (H)     Past Surgical History:   Procedure Laterality Date    HERNIORRHAPHY INGUINAL BILATERAL       Social History     Socioeconomic History    Marital status:      Spouse name: Not on file    Number of children: Not on file    Years of education: Not on file    Highest education level: Not on file   Occupational History    Not on file   Tobacco Use    Smoking status: Former    Smokeless tobacco: Never   Vaping Use    Vaping Use: Never used   Substance and Sexual Activity    Alcohol use: Not Currently    Drug use: No    Sexual activity: Yes     Partners: Female   Other Topics Concern    Parent/sibling w/ CABG, MI or angioplasty before 65F 55M? Not Asked   Social History Narrative    Not on file     Social Determinants of Health     Financial Resource Strain: Not on file   Food Insecurity: Not on file   Transportation Needs: Not on file   Physical Activity: Not on file   Stress: Not on file   Social Connections: Not on file   Interpersonal Safety: Not on file   Housing Stability: Not on file     Family History   Problem Relation Age of Onset    Prostate Cancer Father     Abdominal Aortic Aneurysm Father 70    Prostate Problems Father                Subjective findings 43-year-old presents for left second toe PIPJ myxoid cyst.  Relates has been present for about 4 years duration, started maybe with tight  shoes and he had a still toe boots at work that rubbed on it, hurts at times, does not hurt now but it just drained, relates it does periodically drain and then gets bigger again.  Relates it popped about 3 weeks ago.  Relates he had it surgically removed about 1 year ago and it came right back.  He relates he seen vascular and they sent him here.  I did review vascular's 10/5/2023 note.    Objective findings- DP and PT are 2 out of 4 left.  Has a left DIPJ subcutaneous fluid-filled well-defined dorsal lateral lesion.  There is no erythema, no drainage, no odor, no calor, positive edema, no pain on palpation.  Has distally contracted digits 2 through 5 left foot.    Assessment and plan- Cyst left DIPJ of the second toe.  Mallet toe present.  Diagnosis and treatment options discussed with the patient.  Patient referred to orthopedic surgery for any surgical options and use discussed with them.  Return to clinic and see me as needed.            Low level of medical decision making.

## 2023-11-01 ENCOUNTER — TELEPHONE (OUTPATIENT)
Dept: GASTROENTEROLOGY | Facility: CLINIC | Age: 43
End: 2023-11-01
Payer: COMMERCIAL

## 2023-11-01 NOTE — TELEPHONE ENCOUNTER
Attempted to contact patient in order to complete pre assessment questions.     No answer. Left message to return call to [phone] option 4      Procedure details:    Patient scheduled for Colonoscopy  on 11/14/23.     Arrival time: 1240. Procedure time 1325    Pre op exam needed? N/A    Facility location: [facility] [place]; [address], [place] [address]    Sedation type: Conscious sedation     Indication for procedure: screening       Chart review:     Electronic implanted devices? No    Recent diagnosis of diverticulitis within the last 6 weeks? No    Diabetic? No    Medication review:    Anticoagulants? No    NSAIDS? No    Other medication HOLDING recommendations:  N/A      Prep for procedure:     Bowel prep recommendation: Standard Miralax  Due to: standard bowel prep.    Prep instructions sent via [employer].    Yasmin Barbosa RN  Endoscopy Procedure Pre Assessment RN

## 2023-11-01 NOTE — TELEPHONE ENCOUNTER
Pre assessment completed for upcoming procedure.   (Please see previous telephone encounter notes for complete details)    Patient  returned call.       Procedure details:    Arrival time and facility location reviewed.    Pre op exam needed? N/A    Designated  policy reviewed. Instructed to have someone stay 6 hours post procedure.     COVID policy reviewed.      Medication review:    Medications reviewed. Please see supporting documentation below. Holding recommendations discussed (if applicable).       Prep for procedure:     Procedure prep instructions reviewed.        Additional information needed?  N/A      Patient  verbalized understanding and had no questions or concerns at this time.      Yasmin Barbosa RN  Endoscopy Procedure Pre Assessment RN  [phone] option 4

## 2023-11-13 ENCOUNTER — ANESTHESIA EVENT (OUTPATIENT)
Dept: SURGERY | Facility: AMBULATORY SURGERY CENTER | Age: 43
End: 2023-11-13
Payer: COMMERCIAL

## 2023-11-14 ENCOUNTER — ANESTHESIA (OUTPATIENT)
Dept: SURGERY | Facility: AMBULATORY SURGERY CENTER | Age: 43
End: 2023-11-14
Payer: COMMERCIAL

## 2023-11-14 ENCOUNTER — HOSPITAL ENCOUNTER (OUTPATIENT)
Facility: AMBULATORY SURGERY CENTER | Age: 43
Discharge: HOME | End: 2023-11-14
Attending: INTERNAL MEDICINE
Payer: COMMERCIAL

## 2023-11-14 VITALS
TEMPERATURE: 98.8 F | OXYGEN SATURATION: 98 % | DIASTOLIC BLOOD PRESSURE: 75 MMHG | HEART RATE: 72 BPM | SYSTOLIC BLOOD PRESSURE: 128 MMHG | RESPIRATION RATE: 16 BRPM

## 2023-11-14 VITALS — HEART RATE: 72 BPM

## 2023-11-14 LAB — COLONOSCOPY: NORMAL

## 2023-11-14 PROCEDURE — G8918 PT W/O PREOP ORDER IV AB PRO: HCPCS

## 2023-11-14 PROCEDURE — 45378 DIAGNOSTIC COLONOSCOPY: CPT

## 2023-11-14 PROCEDURE — G8907 PT DOC NO EVENTS ON DISCHARG: HCPCS

## 2023-11-14 RX ORDER — LIDOCAINE HYDROCHLORIDE 20 MG/ML
INJECTION, SOLUTION INFILTRATION; PERINEURAL PRN
Status: DISCONTINUED | OUTPATIENT
Start: 2023-11-14 | End: 2023-11-14

## 2023-11-14 RX ORDER — LIDOCAINE 40 MG/G
CREAM TOPICAL
Status: DISCONTINUED | OUTPATIENT
Start: 2023-11-14 | End: 2023-11-15 | Stop reason: HOSPADM

## 2023-11-14 RX ORDER — FLUMAZENIL 0.1 MG/ML
0.2 INJECTION, SOLUTION INTRAVENOUS
Status: DISCONTINUED | OUTPATIENT
Start: 2023-11-14 | End: 2023-11-15 | Stop reason: HOSPADM

## 2023-11-14 RX ORDER — PROPOFOL 10 MG/ML
INJECTION, EMULSION INTRAVENOUS CONTINUOUS PRN
Status: DISCONTINUED | OUTPATIENT
Start: 2023-11-14 | End: 2023-11-14

## 2023-11-14 RX ORDER — ONDANSETRON 2 MG/ML
4 INJECTION INTRAMUSCULAR; INTRAVENOUS EVERY 6 HOURS PRN
Status: DISCONTINUED | OUTPATIENT
Start: 2023-11-14 | End: 2023-11-15 | Stop reason: HOSPADM

## 2023-11-14 RX ORDER — ONDANSETRON 2 MG/ML
4 INJECTION INTRAMUSCULAR; INTRAVENOUS
Status: DISCONTINUED | OUTPATIENT
Start: 2023-11-14 | End: 2023-11-15 | Stop reason: HOSPADM

## 2023-11-14 RX ORDER — NALOXONE HYDROCHLORIDE 0.4 MG/ML
0.2 INJECTION, SOLUTION INTRAMUSCULAR; INTRAVENOUS; SUBCUTANEOUS
Status: DISCONTINUED | OUTPATIENT
Start: 2023-11-14 | End: 2023-11-15 | Stop reason: HOSPADM

## 2023-11-14 RX ORDER — PROCHLORPERAZINE MALEATE 10 MG
10 TABLET ORAL EVERY 6 HOURS PRN
Status: DISCONTINUED | OUTPATIENT
Start: 2023-11-14 | End: 2023-11-15 | Stop reason: HOSPADM

## 2023-11-14 RX ORDER — SODIUM CHLORIDE, SODIUM LACTATE, POTASSIUM CHLORIDE, CALCIUM CHLORIDE 600; 310; 30; 20 MG/100ML; MG/100ML; MG/100ML; MG/100ML
INJECTION, SOLUTION INTRAVENOUS CONTINUOUS
Status: DISCONTINUED | OUTPATIENT
Start: 2023-11-14 | End: 2023-11-15 | Stop reason: HOSPADM

## 2023-11-14 RX ORDER — ONDANSETRON 4 MG/1
4 TABLET, ORALLY DISINTEGRATING ORAL EVERY 6 HOURS PRN
Status: DISCONTINUED | OUTPATIENT
Start: 2023-11-14 | End: 2023-11-15 | Stop reason: HOSPADM

## 2023-11-14 RX ORDER — NALOXONE HYDROCHLORIDE 0.4 MG/ML
0.4 INJECTION, SOLUTION INTRAMUSCULAR; INTRAVENOUS; SUBCUTANEOUS
Status: DISCONTINUED | OUTPATIENT
Start: 2023-11-14 | End: 2023-11-15 | Stop reason: HOSPADM

## 2023-11-14 RX ADMIN — PROPOFOL 100 MG: 10 INJECTION, EMULSION INTRAVENOUS at 13:16

## 2023-11-14 RX ADMIN — PROPOFOL 150 MCG/KG/MIN: 10 INJECTION, EMULSION INTRAVENOUS at 13:14

## 2023-11-14 RX ADMIN — LIDOCAINE HYDROCHLORIDE 80 MG: 20 INJECTION, SOLUTION INFILTRATION; PERINEURAL at 13:14

## 2023-11-14 RX ADMIN — SODIUM CHLORIDE, SODIUM LACTATE, POTASSIUM CHLORIDE, CALCIUM CHLORIDE: 600; 310; 30; 20 INJECTION, SOLUTION INTRAVENOUS at 12:57

## 2023-11-14 ASSESSMENT — ENCOUNTER SYMPTOMS: DYSRHYTHMIAS: 1

## 2023-11-14 NOTE — ANESTHESIA POSTPROCEDURE EVALUATION
Patient: Dakota France    Procedure: Procedure(s):  Colonoscopy with CO2 insufflation       Anesthesia Type:  MAC    Note:  Disposition: Outpatient   Postop Pain Control: Uneventful            Sign Out: Well controlled pain   PONV: No   Neuro/Psych: Uneventful            Sign Out: Acceptable/Baseline neuro status   Airway/Respiratory: Uneventful            Sign Out: Acceptable/Baseline resp. status   CV/Hemodynamics: Uneventful            Sign Out: Acceptable CV status; No obvious hypovolemia; No obvious fluid overload   Other NRE: NONE   DID A NON-ROUTINE EVENT OCCUR? No       Last vitals:  Vitals Value Taken Time   /79 11/14/23 1337   Temp     Pulse 72 11/14/23 1337   Resp 16 11/14/23 1337   SpO2 97 % 11/14/23 1337       Electronically Signed By: Lamberto Castañeda MD  November 14, 2023  1:54 PM

## 2023-11-14 NOTE — H&P
[facility] Anesthesia Pre-op History and Physical    Dakota France MRN# [id number]   Age: 43 year old YOB: 1980     Date of Exam 11/14/2023         Primary care provider: Kris Bernardo         Chief Complaint and/or Reason for Procedure:     CRC screening - first colonoscopy         Active problem list:     Patient Active Problem List    Diagnosis Date Noted    Paroxysmal atrial fibrillation (H) 01/13/2023     Priority: Medium    Digital mucous cyst of toe of left foot 08/23/2021     Priority: Medium    Bilateral shoulder pain, unspecified chronicity 08/23/2021     Priority: Medium    Seborrheic keratosis 08/23/2021     Priority: Medium    Cholesterolosis gallbladder 06/29/2020     Priority: Medium    Elevated bilirubin 08/13/2019     Priority: Medium    Pigmented skin lesion 07/22/2019     Priority: Medium    Family history of abdominal aortic aneurysm (AAA) 04/11/2018     Priority: Medium    Family history of prostate cancer in father 04/11/2018     Priority: Medium    Skin tag 03/21/2017     Priority: Medium    H/O cardiac arrhythmia-PAC 04/27/2015     Priority: Medium    Overweight (BMI 25.0-29.9) 04/27/2015     Priority: Medium    CARDIOVASCULAR SCREENING; LDL GOAL LESS THAN 160 04/27/2015     Priority: Medium    Impingement syndrome of left shoulder 09/05/2013     Priority: Medium            Medications (include herbals and vitamins):   Any Plavix use in the last 7 days? No     Current Outpatient Medications   Medication Sig    fish oil-omega-3 fatty acids 500 MG capsule     magnesium 250 MG tablet Take 1 tablet by mouth daily 400 mg daily     Current Facility-Administered Medications   Medication    lactated ringers infusion    lidocaine (LMX4) kit    lidocaine (LMX4) kit    lidocaine 1 % 0.1-1 mL    lidocaine 1 % 0.1-1 mL    ondansetron (ZOFRAN) injection 4 mg    sodium chloride (PF) 0.9% PF flush 3 mL    sodium chloride (PF) 0.9% PF flush 3 mL    sodium chloride (PF) 0.9% PF  flush 3 mL    sodium chloride (PF) 0.9% PF flush 3 mL             Allergies:    No Known Allergies  Allergy to Latex? No  Allergy to tape?   No  Intolerances:             Physical Exam:   All vitals have been reviewed  Patient Vitals for the past 8 hrs:   BP Temp Temp src Resp SpO2   11/14/23 1251 (!) 144/88 98.8  F (37.1  C) Temporal 16 99 %     No intake/output data recorded.  Lungs:   No increased work of breathing, good air exchange, clear to auscultation bilaterally, no crackles or wheezing     Cardiovascular:   normal S1 and S2             Lab / Radiology Results:            Anesthetic risk and/or ASA classification:   2    Babs Pagan,

## 2023-11-14 NOTE — ANESTHESIA CARE TRANSFER NOTE
Patient: Dakota France    Procedure: Procedure(s):  Colonoscopy with CO2 insufflation       Diagnosis: Screen for colon cancer [Z12.11]  Diagnosis Additional Information: No value filed.    Anesthesia Type:   MAC     Note:    Oropharynx: oropharynx clear of all foreign objects and spontaneously breathing  Level of Consciousness: drowsy  Oxygen Supplementation: room air    Independent Airway: airway patency satisfactory and stable  Dentition: dentition unchanged  Vital Signs Stable: post-procedure vital signs reviewed and stable  Report to RN Given: handoff report given  Patient transferred to: Phase II    Handoff Report: Identifed the Patient, Identified the Reponsible Provider, Reviewed the pertinent medical history, Discussed the surgical course, Reviewed Intra-OP anesthesia mangement and issues during anesthesia, Set expectations for post-procedure period and Allowed opportunity for questions and acknowledgement of understanding      Vitals:  Vitals Value Taken Time   BP     Temp     Pulse 72 11/14/23 1330   Resp     SpO2         Electronically Signed By: DILIA Ibarra CRNA  November 14, 2023  1:34 PM

## 2023-11-14 NOTE — ANESTHESIA PREPROCEDURE EVALUATION
Anesthesia Pre-Procedure Evaluation    Patient: Dakota France   MRN: [id number] : 1980        Procedure : Procedure(s):  Colonoscopy with CO2 insufflation          Past Medical History:   Diagnosis Date     PAC (premature atrial contraction)       Past Surgical History:   Procedure Laterality Date     HERNIORRHAPHY INGUINAL BILATERAL        No Known Allergies   Social History     Tobacco Use     Smoking status: Former     Smokeless tobacco: Never   Substance Use Topics     Alcohol use: Not Currently      Wt Readings from Last 1 Encounters:   23 80.2 kg (176 lb 14.4 oz)        Anesthesia Evaluation   Pt has had prior anesthetic. Type: General.    No history of anesthetic complications       ROS/MED HX  ENT/Pulmonary:  - neg pulmonary ROS     Neurologic:  - neg neurologic ROS     Cardiovascular: Comment: History of atrial fibrillation possibly due to excessive caffeine. No current treatment.    (+)  - -   -  - -                        dysrhythmias, PVCs and a-fib,             METS/Exercise Tolerance:     Hematologic:  - neg hematologic  ROS     Musculoskeletal:  - neg musculoskeletal ROS     GI/Hepatic:  - neg GI/hepatic ROS     Renal/Genitourinary:  - neg Renal ROS     Endo:  - neg endo ROS     Psychiatric/Substance Use:  - neg psychiatric ROS     Infectious Disease:  - neg infectious disease ROS     Malignancy:  - neg malignancy ROS     Other:  - neg other ROS        Physical Exam    Airway  airway exam normal           Respiratory Devices and Support         Dental       (+) Completely normal teeth      Cardiovascular   cardiovascular exam normal          Pulmonary   pulmonary exam normal            OUTSIDE LABS:  CBC:   Lab Results   Component Value Date    WBC 5.4 2022    WBC 5.3 2019    HGB 15.7 2022    HGB 15.5 2019    HCT 46.5 2022    HCT 45.5 2019     2022     2019     BMP:   Lab Results   Component Value Date     2022     " 07/22/2019    POTASSIUM 4.2 02/08/2022    POTASSIUM 4.1 07/22/2019    CHLORIDE 105 02/08/2022    CHLORIDE 106 07/22/2019    CO2 28 02/08/2022    CO2 30 07/22/2019    BUN 14 02/08/2022    BUN 18 07/22/2019    CR 0.96 02/08/2022    CR 0.97 07/22/2019    GLC 90 02/08/2022    GLC 98 07/22/2019     COAGS: No results found for: \"PTT\", \"INR\", \"FIBR\"  POC: No results found for: \"BGM\", \"HCG\", \"HCGS\"  HEPATIC:   Lab Results   Component Value Date    ALBUMIN 4.3 02/08/2022    PROTTOTAL 8.0 02/08/2022    ALT 27 02/08/2022    AST 16 02/08/2022    ALKPHOS 70 02/08/2022    BILITOTAL 1.3 02/08/2022     OTHER:   Lab Results   Component Value Date    A1C 5.3 07/22/2019    FERNANDO 9.5 02/08/2022    MAG 2.0 04/27/2015    LIPASE 89 08/12/2019    AMYLASE 58 08/12/2019    TSH 2.01 11/01/2022       Anesthesia Plan    ASA Status:  2    NPO Status:  NPO Appropriate    Anesthesia Type: MAC.     - Reason for MAC: straight local not clinically adequate   Induction: Intravenous, Propofol.   Maintenance: TIVA.        Consents    Anesthesia Plan(s) and associated risks, benefits, and realistic alternatives discussed. Questions answered and patient/representative(s) expressed understanding.     - Discussed:     - Discussed with:  Patient, Spouse      - Extended Intubation/Ventilatory Support Discussed: No.      - Patient is DNR/DNI Status: No     Use of blood products discussed: No .     Postoperative Care    Post procedure pain management: None required.   PONV prophylaxis: Ondansetron (or other 5HT-3), Background Propofol Infusion     Comments:              Lamberto Castañeda MD  "

## 2023-11-20 ENCOUNTER — PRE VISIT (OUTPATIENT)
Dept: ORTHOPEDICS | Facility: CLINIC | Age: 43
End: 2023-11-20

## 2024-01-04 ENCOUNTER — PATIENT OUTREACH (OUTPATIENT)
Dept: CARE COORDINATION | Facility: CLINIC | Age: 44
End: 2024-01-04
Payer: COMMERCIAL

## 2024-01-18 ENCOUNTER — PATIENT OUTREACH (OUTPATIENT)
Dept: CARE COORDINATION | Facility: CLINIC | Age: 44
End: 2024-01-18
Payer: COMMERCIAL

## 2024-03-24 ENCOUNTER — HEALTH MAINTENANCE LETTER (OUTPATIENT)
Age: 44
End: 2024-03-24

## 2024-07-02 SDOH — HEALTH STABILITY: PHYSICAL HEALTH: ON AVERAGE, HOW MANY MINUTES DO YOU ENGAGE IN EXERCISE AT THIS LEVEL?: 60 MIN

## 2024-07-02 SDOH — HEALTH STABILITY: PHYSICAL HEALTH: ON AVERAGE, HOW MANY DAYS PER WEEK DO YOU ENGAGE IN MODERATE TO STRENUOUS EXERCISE (LIKE A BRISK WALK)?: 4 DAYS

## 2024-07-02 ASSESSMENT — SOCIAL DETERMINANTS OF HEALTH (SDOH): HOW OFTEN DO YOU GET TOGETHER WITH FRIENDS OR RELATIVES?: TWICE A WEEK

## 2024-07-03 ENCOUNTER — OFFICE VISIT (OUTPATIENT)
Dept: FAMILY MEDICINE | Facility: CLINIC | Age: 44
End: 2024-07-03
Payer: COMMERCIAL

## 2024-07-03 VITALS
SYSTOLIC BLOOD PRESSURE: 122 MMHG | HEIGHT: 72 IN | BODY MASS INDEX: 25.06 KG/M2 | DIASTOLIC BLOOD PRESSURE: 86 MMHG | OXYGEN SATURATION: 99 % | HEART RATE: 77 BPM | WEIGHT: 185 LBS | RESPIRATION RATE: 16 BRPM | TEMPERATURE: 97.8 F

## 2024-07-03 DIAGNOSIS — Z00.00 ROUTINE GENERAL MEDICAL EXAMINATION AT A HEALTH CARE FACILITY: Primary | ICD-10-CM

## 2024-07-03 DIAGNOSIS — Z13.0 SCREENING FOR DEFICIENCY ANEMIA: ICD-10-CM

## 2024-07-03 DIAGNOSIS — I48.0 PAROXYSMAL ATRIAL FIBRILLATION (H): ICD-10-CM

## 2024-07-03 DIAGNOSIS — Z13.1 SCREENING FOR DIABETES MELLITUS (DM): ICD-10-CM

## 2024-07-03 DIAGNOSIS — Z12.5 PROSTATE CANCER SCREENING: ICD-10-CM

## 2024-07-03 DIAGNOSIS — Z13.220 SCREENING FOR HYPERLIPIDEMIA: ICD-10-CM

## 2024-07-03 DIAGNOSIS — R03.0 ELEVATED BP WITHOUT DIAGNOSIS OF HYPERTENSION: ICD-10-CM

## 2024-07-03 DIAGNOSIS — Z80.42 FAMILY HISTORY OF PROSTATE CANCER IN FATHER: ICD-10-CM

## 2024-07-03 DIAGNOSIS — M67.472 DIGITAL MUCOUS CYST OF TOE OF LEFT FOOT: ICD-10-CM

## 2024-07-03 LAB
ALBUMIN SERPL BCG-MCNC: 4.7 G/DL (ref 3.5–5.2)
ALP SERPL-CCNC: 79 U/L (ref 40–150)
ALT SERPL W P-5'-P-CCNC: 22 U/L (ref 0–70)
ANION GAP SERPL CALCULATED.3IONS-SCNC: 12 MMOL/L (ref 7–15)
AST SERPL W P-5'-P-CCNC: 26 U/L (ref 0–45)
BILIRUB SERPL-MCNC: 1.8 MG/DL
BUN SERPL-MCNC: 17.6 MG/DL (ref 6–20)
CALCIUM SERPL-MCNC: 9.6 MG/DL (ref 8.6–10)
CHLORIDE SERPL-SCNC: 104 MMOL/L (ref 98–107)
CHOLEST SERPL-MCNC: 205 MG/DL
CREAT SERPL-MCNC: 1.06 MG/DL (ref 0.67–1.17)
CREAT UR-MCNC: 150 MG/DL
DEPRECATED HCO3 PLAS-SCNC: 24 MMOL/L (ref 22–29)
EGFRCR SERPLBLD CKD-EPI 2021: 88 ML/MIN/1.73M2
ERYTHROCYTE [DISTWIDTH] IN BLOOD BY AUTOMATED COUNT: 11.7 % (ref 10–15)
FASTING STATUS PATIENT QL REPORTED: YES
FASTING STATUS PATIENT QL REPORTED: YES
GLUCOSE SERPL-MCNC: 87 MG/DL (ref 70–99)
HCT VFR BLD AUTO: 46.1 % (ref 40–53)
HDLC SERPL-MCNC: 53 MG/DL
HGB BLD-MCNC: 15.1 G/DL (ref 13.3–17.7)
LDLC SERPL CALC-MCNC: 133 MG/DL
MCH RBC QN AUTO: 30.1 PG (ref 26.5–33)
MCHC RBC AUTO-ENTMCNC: 32.8 G/DL (ref 31.5–36.5)
MCV RBC AUTO: 92 FL (ref 78–100)
MICROALBUMIN UR-MCNC: <12 MG/L
MICROALBUMIN/CREAT UR: NORMAL MG/G{CREAT}
NONHDLC SERPL-MCNC: 152 MG/DL
PLATELET # BLD AUTO: 176 10E3/UL (ref 150–450)
POTASSIUM SERPL-SCNC: 4.4 MMOL/L (ref 3.4–5.3)
PROT SERPL-MCNC: 7.6 G/DL (ref 6.4–8.3)
PSA SERPL DL<=0.01 NG/ML-MCNC: 1.38 NG/ML (ref 0–2.5)
RBC # BLD AUTO: 5.02 10E6/UL (ref 4.4–5.9)
SODIUM SERPL-SCNC: 140 MMOL/L (ref 135–145)
TRIGL SERPL-MCNC: 96 MG/DL
WBC # BLD AUTO: 4.5 10E3/UL (ref 4–11)

## 2024-07-03 PROCEDURE — 80053 COMPREHEN METABOLIC PANEL: CPT | Performed by: FAMILY MEDICINE

## 2024-07-03 PROCEDURE — 82570 ASSAY OF URINE CREATININE: CPT | Performed by: FAMILY MEDICINE

## 2024-07-03 PROCEDURE — 82043 UR ALBUMIN QUANTITATIVE: CPT | Performed by: FAMILY MEDICINE

## 2024-07-03 PROCEDURE — 36415 COLL VENOUS BLD VENIPUNCTURE: CPT | Performed by: FAMILY MEDICINE

## 2024-07-03 PROCEDURE — 85027 COMPLETE CBC AUTOMATED: CPT | Performed by: FAMILY MEDICINE

## 2024-07-03 PROCEDURE — 99396 PREV VISIT EST AGE 40-64: CPT | Performed by: FAMILY MEDICINE

## 2024-07-03 PROCEDURE — 80061 LIPID PANEL: CPT | Performed by: FAMILY MEDICINE

## 2024-07-03 PROCEDURE — G0103 PSA SCREENING: HCPCS | Performed by: FAMILY MEDICINE

## 2024-07-03 ASSESSMENT — PAIN SCALES - GENERAL: PAINLEVEL: NO PAIN (0)

## 2024-07-03 NOTE — PATIENT INSTRUCTIONS
"Patient Education   Preventive Care Advice   This is general advice we often give to help people stay healthy. Your care team may have specific advice just for you. Please talk to your care team about your own preventive care needs.  Lifestyle  Exercise at least 150 minutes each week (30 minutes a day, 5 days a week).  Do muscle strengthening activities 2 days a week. These help control your weight and prevent disease.  No smoking.  Wear sunscreen to prevent skin cancer.  Have your home tested for radon every 2 to 5 years. Radon is a colorless, odorless gas that can harm your lungs. To learn more, go to www.health.[facility].[place].us and search for \"Radon in Homes.\"  Keep guns unloaded and locked up in a safe place like a safe or gun vault, or, use a gun lock and hide the keys. Always lock away bullets separately. To learn more, visit [employer].[place].gov and search for \"safe gun storage.\"  Nutrition  Eat 5 or more servings of fruits and vegetables each day.  Try wheat bread, brown rice and whole grain pasta (instead of white bread, rice, and pasta).  Get enough calcium and vitamin D. Check the label on foods and aim for 100% of the RDA (recommended daily allowance).  Regular exams  Have a dental exam and cleaning every 6 months.  See your health care team every year to talk about:  Any changes in your health.  Any medicines your care team has prescribed.  Preventive care, family planning, and ways to prevent chronic diseases.  Shots (vaccines)   HPV shots (up to age 26), if you've never had them before.  Hepatitis B shots (up to age 59), if you've never had them before.  COVID-19 shot: Get this shot when it's due.  Flu shot: Get a flu shot every year.  Tetanus shot: Get a tetanus shot every 10 years.  Pneumococcal, hepatitis A, and RSV shots: Ask your care team if you need these based on your risk.  Shingles shot (for age 50 and up).  General health tests  Diabetes screening:  Starting at age 35, Get screened for diabetes at least " every 3 years.  If you are younger than age 35, ask your care team if you should be screened for diabetes.  Cholesterol test: At age 39, start having a cholesterol test every 5 years, or more often if advised.  Bone density scan (DEXA): At age 50, ask your care team if you should have this scan for osteoporosis (brittle bones).  Hepatitis C: Get tested at least once in your life.  Abdominal aortic aneurysm screening: Talk to your doctor about having this screening if you:  Have ever smoked; and  Are biologically male; and  Are between the ages of 65 and 75.  STIs (sexually transmitted infections)  Before age 24: Ask your care team if you should be screened for STIs.  After age 24: Get screened for STIs if you're at risk. You are at risk for STIs (including HIV) if:  You are sexually active with more than one person.  You don't use condoms every time.  You or a partner was diagnosed with a sexually transmitted infection.  If you are at risk for HIV, ask about PrEP medicine to prevent HIV.  Get tested for HIV at least once in your life, whether you are at risk for HIV or not.  Cancer screening tests  Cervical cancer screening: If you have a cervix, begin getting regular cervical cancer screening tests at age 21. Most people who have regular screenings with normal results can stop after age 65. Talk about this with your provider.  Breast cancer scan (mammogram): If you've ever had breasts, begin having regular mammograms starting at age 40. This is a scan to check for breast cancer.  Colon cancer screening: It is important to start screening for colon cancer at age 45.  Have a colonoscopy test every 10 years (or more often if you're at risk) Or, ask your provider about stool tests like a FIT test every year or Cologuard test every 3 years.  To learn more about your testing options, visit: www.[employer]/020270.pdf.  For help making a decision, visit: kristi/tf96840.  Prostate cancer screening test: If you have a  prostate and are age 55 to 69, ask your provider if you would benefit from a yearly prostate cancer screening test.  Lung cancer screening: If you are a current or former smoker age 50 to 80, ask your care team if ongoing lung cancer screenings are right for you.  For informational purposes only. Not to replace the advice of your health care provider. Copyright   2023 [facility]. All rights reserved. Clinically reviewed by the [facility] Transitions Program. [employer] [id number] - REV 04/24.

## 2024-07-03 NOTE — PROGRESS NOTES
Preventive Care Visit  [facility]  Kris Bernardo MD, Family Medicine  Jul 3, 2024      Assessment & Plan     Routine general medical examination at a health care facility  : Discussed on regular exercises, healthy eating,   and routine dental checks.    Screening for deficiency anemia    - CBC with platelets; Future    Screening for diabetes mellitus (DM)    - Comprehensive metabolic panel (BMP + Alb, Alk Phos, ALT, AST, Total. Bili, TP); Future    Screening for hyperlipidemia    - Lipid panel reflex to direct LDL Non-fasting; Future    Prostate cancer screening    - PSA, screen; Future    Paroxysmal atrial fibrillation (H)  Reviewed cardiology visit note from 12/13/2022 with Dr. Chad Moreira.  He had only 1 documented episode patient has been avoiding all potential triggers including caffeine  His 48-hour Holter monitor demonstrated 18% PACs and occasional PVCs at that time with no atrial fibrillation   reviewed last EKG from 11/1/2022-normal and in sinus rhythm  Continue to monitor, follow-up as needed      Family history of prostate cancer in father  Continue with annual PSA  - PSA, screen; Future    Elevated BP without diagnosis of hypertension  BP Readings from Last 6 Encounters:   07/03/24 122/86   11/14/23 128/75   10/05/23 (!) 138/97   01/13/23 124/80   12/13/22 130/82   11/01/22 114/76     Initial blood pressure was 145/95  Rechecked-122/86  Patient reported having elevated home blood pressure numbers  Recommended to start checking blood pressures twice a day at home for the next 2 weeks if they are consistently above 140/90,  Patient will schedule for video visit to discuss further recommendations and management will also get a urine microalbumin today for screening  Continue with regular exercises, healthy eating  - Albumin Random Urine Quantitative with Creat Ratio; Future    Digital mucous cyst of toe of left foot  Reviewed visit note from orthopedic surgeon Dr. Wakefield  "and vascular surgeon  Patient is currently in the monitoring phase with no concerns for recent increase in size or pain  Continue to monitor, follow-up as needed            BMI  Estimated body mass index is 25.2 kg/m  as calculated from the following:    Height as of this encounter: 1.825 m (5' 11.85\").    Weight as of this encounter: 83.9 kg (185 lb).   Weight management plan: Continue current efforts on healthy eating and regular exercises    Counseling  Appropriate preventive services were discussed with this patient, including applicable screening as appropriate for fall prevention, nutrition, physical activity, Tobacco-use cessation, weight loss and cognition.  Checklist reviewing preventive services available has been given to the patient.  Reviewed patient's diet, addressing concerns and/or questions.       Chart documentation done in part with Dragon Voice recognition Software. Although reviewed after completion, some word and grammatical error may remain.    See Patient Instructions    Damien Duncan is a 44 year old, presenting for the following:  Physical  Elevated blood pressure with no history of hypertension-patient reported having recent borderline elevated blood pressures at home when he checks randomly  He was also turned away at a recent blood donation event due to elevated blood pressures   Denies chest pain, SOB, edema legs, visual concerns, focal neurological symptoms, dizziness, syncope, palpitations.  Patient denies significant family history of hypertension except his mother was very recently diagnosed at age 80s.  Patient denies family or personal history of thyroid disorder and he denies snoring, sleep apnea  He does not smoke  Does not use alcohol, or recreational drugs   denies recent excess NSAID use  Had 1 episode of A-fib in 2022 ,had a cardiology evaluation at that time,   patient denies recent episodes of dizziness, syncope          7/3/2024     7:05 AM   Additional Questions "   Roomed by Diane Lynne Schoenherr RN        Health Care Directive  Patient does not have a Health Care Directive or Living Will: Discussed advance care planning with patient; however, patient declined at this time.    HPI              7/2/2024   General Health   How would you rate your overall physical health? Good   Feel stress (tense, anxious, or unable to sleep) To some extent      (!) STRESS CONCERN      7/2/2024   Nutrition   Three or more servings of calcium each day? Yes   Diet: Regular (no restrictions)   How many servings of fruit and vegetables per day? (!) 2-3   How many sweetened beverages each day? 0-1            7/2/2024   Exercise   Days per week of moderate/strenous exercise 4 days   Average minutes spent exercising at this level 60 min            7/2/2024   Social Factors   Frequency of gathering with friends or relatives Twice a week   Worry food won't last until get money to buy more No   Food not last or not have enough money for food? No   Do you have housing? (Housing is defined as stable permanent housing and does not include staying ouside in a car, in a tent, in an abandoned building, in an overnight shelter, or couch-surfing.) Yes   Are you worried about losing your housing? No   Lack of transportation? No   Unable to get utilities (heat,electricity)? No            7/2/2024   Dental   Dentist two times every year? Yes            7/2/2024   TB Screening   Were you born outside of the [place]? No            Today's PHQ-2 Score:       7/2/2024     8:55 AM   PHQ-2 ( 1999 [employer])   Q1: Little interest or pleasure in doing things 0   Q2: Feeling down, depressed or hopeless 0   PHQ-2 Score 0   Q1: Little interest or pleasure in doing things Not at all   Q2: Feeling down, depressed or hopeless Not at all   PHQ-2 Score 0           7/2/2024   Substance Use   Alcohol more than 3/day or more than 7/wk Not Applicable   Do you use any other substances recreationally? No        Social History     Tobacco Use     Smoking status: Former     Current packs/day: 0.00     Types: Cigarettes     Quit date:      Years since quittin.5    Smokeless tobacco: Never   Vaping Use    Vaping status: Never Used   Substance Use Topics    Alcohol use: Not Currently    Drug use: No           2024   STI Screening   New sexual partner(s) since last STI/HIV test? No      ASCVD Risk   The 10-year ASCVD risk score (Otto MERCER, et al., 2019) is: 2.2%    Values used to calculate the score:      Age: 44 years      Sex: Male      Is Non- : No      Diabetic: No      Tobacco smoker: No      Systolic Blood Pressure: 122 mmHg      Is BP treated: No      HDL Cholesterol: 49 mg/dL      Total Cholesterol: 206 mg/dL        2024   Contraception/Family Planning   Questions about contraception or family planning No           Reviewed and updated as needed this visit by Provider                    Past Medical History:   Diagnosis Date    PAC (premature atrial contraction)      Past Surgical History:   Procedure Laterality Date    COLONOSCOPY WITH CO2 INSUFFLATION N/A 2023    Procedure: Colonoscopy with CO2 insufflation;  Surgeon: Babs Pagan DO;  Location: [facility] OR    HERNIORRHAPHY INGUINAL BILATERAL       Lab work is in process  Labs reviewed in EPIC  BP Readings from Last 3 Encounters:   24 122/86   23 128/75   10/05/23 (!) 138/97    Wt Readings from Last 3 Encounters:   24 83.9 kg (185 lb)   23 80.2 kg (176 lb 14.4 oz)   22 84.1 kg (185 lb 4.8 oz)                  Patient Active Problem List   Diagnosis    H/O cardiac arrhythmia-PAC    Overweight (BMI 25.0-29.9)    CARDIOVASCULAR SCREENING; LDL GOAL LESS THAN 160    Skin tag    Family history of abdominal aortic aneurysm (AAA)    Family history of prostate cancer in father    Pigmented skin lesion    Elevated bilirubin    Cholesterolosis gallbladder    Digital mucous cyst of toe of left foot    Bilateral shoulder pain,  unspecified chronicity    Seborrheic keratosis    Impingement syndrome of left shoulder    Paroxysmal atrial fibrillation (H)    Elevated BP without diagnosis of hypertension     Past Surgical History:   Procedure Laterality Date    COLONOSCOPY WITH CO2 INSUFFLATION N/A 2023    Procedure: Colonoscopy with CO2 insufflation;  Surgeon: Babs Pagan DO;  Location: [facility] OR    HERNIORRHAPHY INGUINAL BILATERAL         Social History     Tobacco Use    Smoking status: Former     Current packs/day: 0.00     Types: Cigarettes     Quit date:      Years since quittin.5    Smokeless tobacco: Never   Substance Use Topics    Alcohol use: Not Currently     Family History   Problem Relation Age of Onset    Prostate Cancer Father     Abdominal Aortic Aneurysm Father 70    Prostate Problems Father          Current Outpatient Medications   Medication Sig Dispense Refill    fish oil-omega-3 fatty acids 500 MG capsule       magnesium 250 MG tablet Take 1 tablet by mouth daily 400 mg daily       No Known Allergies  Recent Labs   Lab Test 22  1112 22  1631 21  1505 10/11/19  0859 19  1341 19  0945   A1C  --   --   --   --   --  5.3   *  --   --   --   --  145*   HDL 49  --   --   --   --  42   TRIG 108  --   --   --   --  171*   ALT  --  27 23 26   < > 21   CR  --  0.96  --   --   --  0.97   GFRESTIMATED  --  >90  --   --   --  >90   GFRESTBLACK  --   --   --   --   --  >90   POTASSIUM  --  4.2  --   --   --  4.1   TSH 2.01  --   --   --   --  1.41    < > = values in this interval not displayed.          Review of Systems  CONSTITUTIONAL: NEGATIVE for fever, chills, change in weight  INTEGUMENTARY/SKIN: NEGATIVE for worrisome rashes, moles or lesions  EYES: NEGATIVE for vision changes or irritation  ENT/MOUTH: NEGATIVE for ear, mouth and throat problems  RESP: NEGATIVE for significant cough or SOB  BREAST: NEGATIVE for masses, tenderness or discharge  CV: NEGATIVE for chest pain,  "palpitations or peripheral edema  CV: Elevated blood pressure  GI: NEGATIVE for nausea, abdominal pain, heartburn, or change in bowel habits  : NEGATIVE for frequency, dysuria, or hematuria  MUSCULOSKELETAL: NEGATIVE for significant arthralgias or myalgia  MUSCULOSKELETAL: Mucoid cyst of the left foot  NEURO: NEGATIVE for weakness, dizziness or paresthesias  ENDOCRINE: NEGATIVE for temperature intolerance, skin/hair changes  HEME: NEGATIVE for bleeding problems  PSYCHIATRIC: NEGATIVE for changes in mood or affect     Objective    Exam  /86 (BP Location: Right arm, Patient Position: Sitting, Cuff Size: Adult Large)   Pulse 77   Temp 97.8  F (36.6  C) (Oral)   Resp 16   Ht 1.825 m (5' 11.85\")   Wt 83.9 kg (185 lb)   SpO2 99%   BMI 25.20 kg/m     Estimated body mass index is 25.2 kg/m  as calculated from the following:    Height as of this encounter: 1.825 m (5' 11.85\").    Weight as of this encounter: 83.9 kg (185 lb).    Physical Exam  GENERAL: alert and no distress  EYES: Eyes grossly normal to inspection, PERRL and conjunctivae and sclerae normal  HENT: ear canals and TM's normal, nose and mouth without ulcers or lesions  NECK: no adenopathy, no asymmetry, masses, or scars  RESP: lungs clear to auscultation - no rales, rhonchi or wheezes  CV: regular rate and rhythm, normal S1 S2, no S3 or S4, no murmur, click or rub, no peripheral edema  ABDOMEN: soft, nontender, no hepatosplenomegaly, no masses and bowel sounds normal  MS: Small nontender cyst in the second digit of left foot  SKIN: no suspicious lesions or rashes  NEURO: Normal strength and tone, mentation intact and speech normal  PSYCH: mentation appears normal, affect normal/bright        Signed Electronically by: Kris Bernardo MD    "

## 2024-07-04 NOTE — RESULT ENCOUNTER NOTE
Melo Duncan,  Your recent labs showed normal hemoglobin, liver functions, fasting blood sugar, urine with no protein leak and normal PSA-prostate cancer screening test.  These are good and reassuring  Your liver functions are normal except for elevated bilirubin as it has been in the past.  Your fasting lipid showed slightly elevated total and LDL cholesterol but your triglycerides and HDL-good cholesterol have improved, this is reassuring.  Please continue with your current efforts on healthy eating, regular exercises and weight loss.   Let me know if you have any questions. Take care.  Kris Bernardo MD

## 2025-06-03 ENCOUNTER — PATIENT OUTREACH (OUTPATIENT)
Dept: CARE COORDINATION | Facility: CLINIC | Age: 45
End: 2025-06-03
Payer: COMMERCIAL

## 2025-06-17 ENCOUNTER — PATIENT OUTREACH (OUTPATIENT)
Dept: CARE COORDINATION | Facility: CLINIC | Age: 45
End: 2025-06-17
Payer: COMMERCIAL

## (undated) DEVICE — PAD CHUX UNDERPAD 23X24" 7136

## (undated) DEVICE — KIT ENDO FIRST STEP DISINFECTANT 200ML W/POUCH EP-4

## (undated) DEVICE — PREP CHLORAPREP 26ML TINTED ORANGE  260815